# Patient Record
Sex: FEMALE | Race: WHITE | HISPANIC OR LATINO | Employment: OTHER | URBAN - METROPOLITAN AREA
[De-identification: names, ages, dates, MRNs, and addresses within clinical notes are randomized per-mention and may not be internally consistent; named-entity substitution may affect disease eponyms.]

---

## 2017-09-26 ENCOUNTER — ALLSCRIPTS OFFICE VISIT (OUTPATIENT)
Dept: OTHER | Facility: OTHER | Age: 62
End: 2017-09-26

## 2017-10-20 ENCOUNTER — GENERIC CONVERSION - ENCOUNTER (OUTPATIENT)
Dept: OTHER | Facility: OTHER | Age: 62
End: 2017-10-20

## 2018-01-02 ENCOUNTER — GENERIC CONVERSION - ENCOUNTER (OUTPATIENT)
Dept: OTHER | Facility: OTHER | Age: 63
End: 2018-01-02

## 2018-01-02 ENCOUNTER — GENERIC CONVERSION - ENCOUNTER (OUTPATIENT)
Dept: FAMILY MEDICINE CLINIC | Facility: CLINIC | Age: 63
End: 2018-01-02

## 2018-01-14 VITALS
WEIGHT: 122 LBS | SYSTOLIC BLOOD PRESSURE: 122 MMHG | BODY MASS INDEX: 20.33 KG/M2 | DIASTOLIC BLOOD PRESSURE: 68 MMHG | HEART RATE: 68 BPM | HEIGHT: 65 IN | TEMPERATURE: 98.7 F

## 2018-01-22 VITALS
SYSTOLIC BLOOD PRESSURE: 124 MMHG | WEIGHT: 121.5 LBS | HEART RATE: 64 BPM | BODY MASS INDEX: 20.24 KG/M2 | HEIGHT: 65 IN | DIASTOLIC BLOOD PRESSURE: 72 MMHG

## 2018-06-22 ENCOUNTER — OFFICE VISIT (OUTPATIENT)
Dept: FAMILY MEDICINE CLINIC | Facility: CLINIC | Age: 63
End: 2018-06-22
Payer: COMMERCIAL

## 2018-06-22 VITALS
HEART RATE: 68 BPM | WEIGHT: 121 LBS | SYSTOLIC BLOOD PRESSURE: 120 MMHG | RESPIRATION RATE: 16 BRPM | BODY MASS INDEX: 22.84 KG/M2 | DIASTOLIC BLOOD PRESSURE: 76 MMHG | HEIGHT: 61 IN

## 2018-06-22 DIAGNOSIS — R20.2 NUMBNESS AND TINGLING OF LEFT LEG: ICD-10-CM

## 2018-06-22 DIAGNOSIS — R20.0 NUMBNESS AND TINGLING OF LEFT LEG: ICD-10-CM

## 2018-06-22 DIAGNOSIS — L98.9 SKIN LESION OF FACE: Primary | ICD-10-CM

## 2018-06-22 PROCEDURE — 1036F TOBACCO NON-USER: CPT | Performed by: FAMILY MEDICINE

## 2018-06-22 PROCEDURE — 3008F BODY MASS INDEX DOCD: CPT | Performed by: FAMILY MEDICINE

## 2018-06-22 PROCEDURE — 99213 OFFICE O/P EST LOW 20 MIN: CPT | Performed by: FAMILY MEDICINE

## 2018-06-22 NOTE — PATIENT INSTRUCTIONS
Problem List Items Addressed This Visit        Musculoskeletal and Integument    Skin lesion of face - Primary     Skin lesion is present, as changed  Based on this, I am concerned enough that she should be seeing Dermatology or plastics  I will enter referral for both, and she will go to ever she can get in with 1st   It does not appear to be malignant, but again it has changed and has coloration difference is in the 2 different areas of it  Relevant Orders    Ambulatory referral to Dermatology    Ambulatory referral to Plastic Surgery       Other    Numbness and tingling of left leg     Patient has minimal findings on exam today  Will re-evaluate in the future  Consider further testing such as x-rays or EMG

## 2018-06-22 NOTE — PROGRESS NOTES
Assessment/Plan:    Skin lesion of face  Skin lesion is present, as changed  Based on this, I am concerned enough that she should be seeing Dermatology or plastics  I will enter referral for both, and she will go to ever she can get in with 1st   It does not appear to be malignant, but again it has changed and has coloration difference is in the 2 different areas of it  Numbness and tingling of left leg  Patient has minimal findings on exam today  Will re-evaluate in the future  Consider further testing such as x-rays or EMG  Diagnoses and all orders for this visit:    Skin lesion of face  -     Ambulatory referral to Dermatology; Future  -     Ambulatory referral to Plastic Surgery; Future    Numbness and tingling of left leg    Other orders  -     aspirin 81 MG tablet; Take by mouth          Subjective: pt here with complains of lump on right side of face x 1 month, also complains of numbness on left thigh 1 month  JAVAN Mooney     Patient ID: Lorene Flynn is a 58 y o  female  Patient is a small lesion on the right side of the face  It is been there for a while now, however recently it has raised  Coloration is slightly darker than before  She was not concerned about it having a difference in color, merely the fact that it has now been raised rather than flat  Patient also mention that she has some irritation on her lower extremities  There is some pins and needles at times  When she goes to stand up she has it more so than not  It is mostly on the left side  The following portions of the patient's history were reviewed and updated as appropriate: allergies, current medications and problem list     Review of Systems   Constitutional: Negative  HENT: Negative      Skin:        Per HPI         Objective:      Vitals:    06/22/18 1540   BP: 120/76   BP Location: Left arm   Patient Position: Sitting   Cuff Size: Standard   Pulse: 68   Resp: 16   Weight: 54 9 kg (121 lb)   Height: 5' 1" (1 549 m)          Physical Exam   Musculoskeletal: She exhibits no edema, tenderness or deformity  No findings on the sciatic notch, greater trochanteric bursae, or low back  Neurological: She displays normal reflexes  She exhibits normal muscle tone  Coordination normal    Skin:   Patient does have a lesion on the face  It is on the right side at the temple  There is a darker pigmented area towards the posterior aspect of it  There is a sharp demarcation between that and the lighter pigmentation anteriorly  The borders of the entire lesion are quite clear and sharp  The darker area is slightly raised verses the later anterior area  Nursing note and vitals reviewed

## 2018-06-22 NOTE — ASSESSMENT & PLAN NOTE
Skin lesion is present, as changed  Based on this, I am concerned enough that she should be seeing Dermatology or plastics  I will enter referral for both, and she will go to ever she can get in with 1st   It does not appear to be malignant, but again it has changed and has coloration difference is in the 2 different areas of it

## 2018-06-22 NOTE — ASSESSMENT & PLAN NOTE
Patient has minimal findings on exam today  Will re-evaluate in the future  Consider further testing such as x-rays or EMG

## 2018-07-13 PROCEDURE — 88341 IMHCHEM/IMCYTCHM EA ADD ANTB: CPT | Performed by: PATHOLOGY

## 2018-07-13 PROCEDURE — 88305 TISSUE EXAM BY PATHOLOGIST: CPT | Performed by: PATHOLOGY

## 2018-07-13 PROCEDURE — 88342 IMHCHEM/IMCYTCHM 1ST ANTB: CPT | Performed by: PATHOLOGY

## 2018-07-16 ENCOUNTER — LAB REQUISITION (OUTPATIENT)
Dept: LAB | Facility: HOSPITAL | Age: 63
End: 2018-07-16
Payer: COMMERCIAL

## 2018-07-16 DIAGNOSIS — D49.2 NEOPLASM OF UNSPECIFIED BEHAVIOR OF BONE, SOFT TISSUE, AND SKIN: ICD-10-CM

## 2019-04-30 ENCOUNTER — OFFICE VISIT (OUTPATIENT)
Dept: FAMILY MEDICINE CLINIC | Facility: CLINIC | Age: 64
End: 2019-04-30
Payer: COMMERCIAL

## 2019-04-30 VITALS
TEMPERATURE: 99 F | SYSTOLIC BLOOD PRESSURE: 106 MMHG | DIASTOLIC BLOOD PRESSURE: 70 MMHG | WEIGHT: 118.4 LBS | HEIGHT: 61 IN | BODY MASS INDEX: 22.36 KG/M2

## 2019-04-30 DIAGNOSIS — Z12.31 SCREENING MAMMOGRAM, ENCOUNTER FOR: ICD-10-CM

## 2019-04-30 DIAGNOSIS — Z23 ENCOUNTER FOR IMMUNIZATION: Primary | ICD-10-CM

## 2019-04-30 DIAGNOSIS — Z12.11 ENCOUNTER FOR SCREENING COLONOSCOPY: ICD-10-CM

## 2019-04-30 DIAGNOSIS — R35.0 URINARY FREQUENCY: ICD-10-CM

## 2019-04-30 DIAGNOSIS — N30.01 ACUTE CYSTITIS WITH HEMATURIA: ICD-10-CM

## 2019-04-30 PROBLEM — D17.24 LIPOMA OF LEFT LOWER EXTREMITY: Status: ACTIVE | Noted: 2017-10-20

## 2019-04-30 LAB
SL AMB  POCT GLUCOSE, UA: NORMAL
SL AMB LEUKOCYTE ESTERASE,UA: NORMAL
SL AMB POCT BILIRUBIN,UA: NORMAL
SL AMB POCT BLOOD,UA: NORMAL
SL AMB POCT CLARITY,UA: CLEAR
SL AMB POCT COLOR,UA: YELLOW
SL AMB POCT KETONES,UA: NORMAL
SL AMB POCT NITRITE,UA: NORMAL
SL AMB POCT PH,UA: 6
SL AMB POCT SPECIFIC GRAVITY,UA: 1.02
SL AMB POCT URINE PROTEIN: NORMAL
SL AMB POCT UROBILINOGEN: 0.2

## 2019-04-30 PROCEDURE — 1101F PT FALLS ASSESS-DOCD LE1/YR: CPT | Performed by: PHYSICIAN ASSISTANT

## 2019-04-30 PROCEDURE — 3008F BODY MASS INDEX DOCD: CPT | Performed by: PHYSICIAN ASSISTANT

## 2019-04-30 PROCEDURE — 99214 OFFICE O/P EST MOD 30 MIN: CPT | Performed by: PHYSICIAN ASSISTANT

## 2019-04-30 PROCEDURE — 87086 URINE CULTURE/COLONY COUNT: CPT | Performed by: PHYSICIAN ASSISTANT

## 2019-04-30 PROCEDURE — 1036F TOBACCO NON-USER: CPT | Performed by: PHYSICIAN ASSISTANT

## 2019-04-30 PROCEDURE — 87077 CULTURE AEROBIC IDENTIFY: CPT | Performed by: PHYSICIAN ASSISTANT

## 2019-04-30 PROCEDURE — 81003 URINALYSIS AUTO W/O SCOPE: CPT | Performed by: PHYSICIAN ASSISTANT

## 2019-04-30 PROCEDURE — 87186 SC STD MICRODIL/AGAR DIL: CPT | Performed by: PHYSICIAN ASSISTANT

## 2019-04-30 RX ORDER — NITROFURANTOIN 25; 75 MG/1; MG/1
100 CAPSULE ORAL 2 TIMES DAILY
Qty: 14 CAPSULE | Refills: 0 | Status: SHIPPED | OUTPATIENT
Start: 2019-04-30 | End: 2019-05-07

## 2019-05-03 LAB — BACTERIA UR CULT: ABNORMAL

## 2019-08-29 ENCOUNTER — OFFICE VISIT (OUTPATIENT)
Dept: FAMILY MEDICINE CLINIC | Facility: CLINIC | Age: 64
End: 2019-08-29
Payer: COMMERCIAL

## 2019-08-29 ENCOUNTER — APPOINTMENT (OUTPATIENT)
Dept: RADIOLOGY | Facility: MEDICAL CENTER | Age: 64
End: 2019-08-29
Payer: COMMERCIAL

## 2019-08-29 VITALS
BODY MASS INDEX: 22.2 KG/M2 | SYSTOLIC BLOOD PRESSURE: 126 MMHG | HEIGHT: 61 IN | HEART RATE: 72 BPM | RESPIRATION RATE: 18 BRPM | WEIGHT: 117.6 LBS | DIASTOLIC BLOOD PRESSURE: 74 MMHG | TEMPERATURE: 98.1 F | OXYGEN SATURATION: 98 %

## 2019-08-29 DIAGNOSIS — Z02.1 PHYSICAL EXAM, PRE-EMPLOYMENT: Primary | ICD-10-CM

## 2019-08-29 DIAGNOSIS — Z02.1 PHYSICAL EXAM, PRE-EMPLOYMENT: ICD-10-CM

## 2019-08-29 PROCEDURE — 71046 X-RAY EXAM CHEST 2 VIEWS: CPT

## 2019-08-29 PROCEDURE — 99397 PER PM REEVAL EST PAT 65+ YR: CPT | Performed by: FAMILY MEDICINE

## 2019-08-29 NOTE — PATIENT INSTRUCTIONS
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

## 2019-11-12 ENCOUNTER — OFFICE VISIT (OUTPATIENT)
Dept: FAMILY MEDICINE CLINIC | Facility: CLINIC | Age: 64
End: 2019-11-12
Payer: COMMERCIAL

## 2019-11-12 VITALS
DIASTOLIC BLOOD PRESSURE: 70 MMHG | SYSTOLIC BLOOD PRESSURE: 130 MMHG | TEMPERATURE: 98 F | WEIGHT: 119 LBS | BODY MASS INDEX: 22.47 KG/M2 | HEIGHT: 61 IN

## 2019-11-12 DIAGNOSIS — N30.01 ACUTE CYSTITIS WITH HEMATURIA: Primary | ICD-10-CM

## 2019-11-12 LAB
SL AMB  POCT GLUCOSE, UA: ABNORMAL
SL AMB LEUKOCYTE ESTERASE,UA: ABNORMAL
SL AMB POCT BILIRUBIN,UA: ABNORMAL
SL AMB POCT BLOOD,UA: ABNORMAL
SL AMB POCT CLARITY,UA: ABNORMAL
SL AMB POCT COLOR,UA: YELLOW
SL AMB POCT KETONES,UA: ABNORMAL
SL AMB POCT NITRITE,UA: ABNORMAL
SL AMB POCT PH,UA: 7.5
SL AMB POCT SPECIFIC GRAVITY,UA: 1.01
SL AMB POCT URINE PROTEIN: ABNORMAL
SL AMB POCT UROBILINOGEN: 1

## 2019-11-12 PROCEDURE — 87077 CULTURE AEROBIC IDENTIFY: CPT | Performed by: FAMILY MEDICINE

## 2019-11-12 PROCEDURE — 87086 URINE CULTURE/COLONY COUNT: CPT | Performed by: FAMILY MEDICINE

## 2019-11-12 PROCEDURE — 99213 OFFICE O/P EST LOW 20 MIN: CPT | Performed by: FAMILY MEDICINE

## 2019-11-12 PROCEDURE — 1036F TOBACCO NON-USER: CPT | Performed by: FAMILY MEDICINE

## 2019-11-12 PROCEDURE — 81002 URINALYSIS NONAUTO W/O SCOPE: CPT | Performed by: FAMILY MEDICINE

## 2019-11-12 PROCEDURE — 87186 SC STD MICRODIL/AGAR DIL: CPT | Performed by: FAMILY MEDICINE

## 2019-11-12 RX ORDER — NITROFURANTOIN 25; 75 MG/1; MG/1
100 CAPSULE ORAL 2 TIMES DAILY
Qty: 14 CAPSULE | Refills: 0 | Status: SHIPPED | OUTPATIENT
Start: 2019-11-12 | End: 2019-11-19

## 2019-11-12 NOTE — PROGRESS NOTES
Assessment and Plan:    Problem List Items Addressed This Visit     Acute cystitis with hematuria - Primary    Relevant Medications    nitrofurantoin (MACROBID) 100 mg capsule    Other Relevant Orders    POCT urine dip (Completed)    Urine culture                 Diagnoses and all orders for this visit:    Acute cystitis with hematuria  Comments:  Recommend Macrobid  Follow in 2 weeks if needed  Reviewed hygiene  Orders:  -     POCT urine dip  -     Cancel: Urine culture; Future  -     Urine culture; Future  -     nitrofurantoin (MACROBID) 100 mg capsule; Take 1 capsule (100 mg total) by mouth 2 (two) times a day for 7 days              Subjective:      Patient ID: Denver Ponder is a 71 y o  female  CC:    Chief Complaint   Patient presents with    Possible UTI     pt is c/o of foul smell to urine and frequency     Urinary Frequency       HPI:    Patient feels she may have a UTI  No burning  Some urgency, some frequency  Smell is quite bad  Has been 2-3 weeks  Staying the same  No meds so far  Previously she did have infections, and had fever with that  Reviewed hygene  The following portions of the patient's history were reviewed and updated as appropriate: allergies, current medications and problem list       Review of Systems   Constitutional: Positive for fatigue  Negative for chills and fever  HENT: Negative  Respiratory: Negative  Genitourinary: Positive for frequency and urgency  Negative for difficulty urinating and dysuria  Musculoskeletal: Negative  All other systems reviewed and are negative  Data to review:   UA did show some significant abnormalities today  Please see report in the chart      Objective:    Vitals:    11/12/19 1514   BP: 130/70   BP Location: Left arm   Patient Position: Sitting   Cuff Size: Adult   Temp: 98 °F (36 7 °C)   TempSrc: Oral   Weight: 54 kg (119 lb)   Height: 5' 1" (1 549 m)        Physical Exam   Constitutional: She appears well-developed and well-nourished  HENT:   Head: Normocephalic and atraumatic  Cardiovascular: Normal rate, regular rhythm and normal heart sounds  Pulses:       Carotid pulses are 2+ on the right side, and 2+ on the left side  Pulmonary/Chest: Effort normal and breath sounds normal  She has no wheezes  She has no rales  She exhibits no tenderness  Nursing note and vitals reviewed

## 2019-11-12 NOTE — PATIENT INSTRUCTIONS
Problem List Items Addressed This Visit     Acute cystitis with hematuria - Primary    Relevant Medications    nitrofurantoin (MACROBID) 100 mg capsule    Other Relevant Orders    POCT urine dip (Completed)    Urine culture        Urinary Tract Infection in Women   AMBULATORY CARE:   A urinary tract infection (UTI)  is caused by bacteria that get inside your urinary tract  Most bacteria that enter your urinary tract come out when you urinate  If the bacteria stay in your urinary tract, you may get an infection  Your urinary tract includes your kidneys, ureters, bladder, and urethra  Urine is made in your kidneys, and it flows from the ureters to the bladder  Urine leaves the bladder through the urethra  A UTI is more common in your lower urinary tract, which includes your bladder and urethra  Common symptoms include the following:   · Urinating more often or waking from sleep to urinate    · Pain or burning when you urinate    · Pain or pressure in your lower abdomen     · Urine that smells bad    · Blood in your urine    · Leaking urine  Seek care immediately if:   · You are urinating very little or not at all  · You have a high fever with shaking chills  · You have side or back pain that gets worse  Contact your healthcare provider if:   · You have a fever  · You do not feel better after 2 days of taking antibiotics  · You are vomiting  · You have questions or concerns about your condition or care  Treatment for a UTI  may include medicines to treat a bacterial infection  You may also need medicines to decrease pain and burning, or decrease the urge to urinate often  Prevent a UTI:   · Empty your bladder often  Urinate and empty your bladder as soon as you feel the need  Do not hold your urine for long periods of time  · Wipe from front to back after you urinate or have a bowel movement    This will help prevent germs from getting into your urinary tract through your urethra  · Drink liquids as directed  Ask how much liquid to drink each day and which liquids are best for you  You may need to drink more liquids than usual to help flush out the bacteria  Do not drink alcohol, caffeine, or citrus juices  These can irritate your bladder and increase your symptoms  Your healthcare provider may recommend cranberry juice to help prevent a UTI  · Urinate after you have sex  This can help flush out bacteria passed during sex  · Do not douche or use feminine deodorants  These can change the chemical balance in your vagina  · Change sanitary pads or tampons often  This will help prevent germs from getting into your urinary tract  · Do pelvic muscle exercises often  Pelvic muscle exercises may help you start and stop urinating  Strong pelvic muscles may help you empty your bladder easier  Squeeze these muscles tightly for 5 seconds like you are trying to hold back urine  Then relax for 5 seconds  Gradually work up to squeezing for 10 seconds  Do 3 sets of 15 repetitions a day, or as directed  Follow up with your healthcare provider as directed:  Write down your questions so you remember to ask them during your visits  © 2017 [facility] Information is for End User's use only and may not be sold, redistributed or otherwise used for commercial purposes  All illustrations and images included in CareNotes® are the copyrighted property of A D A M , Inc  or Austin Sanchez  The above information is an  only  It is not intended as medical advice for individual conditions or treatments  Talk to your doctor, nurse or pharmacist before following any medical regimen to see if it is safe and effective for you  Infección del tracto urinario en mujeres, cuidados ambulatorios   INFORMACIÓN GENERAL:   Eryn infección en el tracto urinario (ITU)  ocurre cuando entran bacterias en da silva tracto urinario   Da Silva tracto urinario incluye wendy riñones, uréteres, vejiga y uretra  La orina es producida en los riñones y fluye del uréter a la vejiga  La orina sale de la vejiga a través de la uretra  Carla infección en el tracto urinario es más común en la parte inferior de villareal tracto urinario, la cual incluye villareal vejiga y uretra  Síntomas comunes incluyen los siguientes:   · Orinar con más frecuencia o despertarse en la noche para orinar    · Dolor o ardor al orinar    · Dolor o presión en la parte inferior de villareal abdomen     · Orina que tiene carla aroma desagradable    · Tomas en villareal orina    · Goteo de orina  Busque cuidados inmediatos para los siguientes síntomas:   · Robledo Castro o no orinar del todo    · Vómito    · Escalofríos sara y fiebre    · Dolor al lado o de espalda que empeora  El tratamiento para la ITU  puede llegar a incluir medicamentos para tratar carla infección bacterial  Es probable también que usted necesite medicamentos para disminuir el dolor y la quemazón o disminuir la urgencia de orinar frecuentemente  Prevenga carla ITU:   · Orine cuando sienta la urgencia  No aguante la orina  Orine lay pronto shelby sienta la necesidad de [place]  · [place] líquidos según indicaciones  Pregunte cuánto líquido usted necesita beber a diario y cuáles líquidos son mejores para usted  Es probable que usted necesite ud más líquidos de lo normal para ayudar a deshacerse de la bacteria  No tome bebidas alcohólicas, cafeína y jugos cítricos  Estos pueden irritar villareal vejiga y aumentar wendy síntomas  · Aplique calor  sobre villareal abdomen por 20 a 30 minutos cada 2 horas por tantos días shelby le indiquen  El calor ayuda a disminuir las molestias y la presión en villareal vejiga  Programe carla keri con villareal proveedor de [employer] se le haya indicado: Anote wendy preguntas para que se acuerde de hacerlas yamile wendy visitas  ACUERDOS SOBRE VILLAREAL CUIDADO:   Usted tiene el derecho de participar en la planificación de villareal cuidado   Aprenda todo lo que pueda [facility] y shelby darle tratamiento  Discuta con wendy médicos wendy opciones de tratamiento para juntos decidir el cuidado que usted quiere recibir  Usted siempre tiene el derecho a rechazar da silva tratamiento  Esta información es sólo para uso en educación  Da Silva intención no es darle un consejo médico sobre enfermedades o tratamientos  Colsulte con da silva Mabeline Loser farmacéutico antes de seguir cualquier régimen médico para saber si es seguro y efectivo para usted  © 2014 [address] [employer] is for End User's use only and may not be sold, redistributed or otherwise used for commercial purposes  All illustrations and images included in CareNotes® are the copyrighted property of A D A M , Inc  or [facility]

## 2019-11-15 LAB — BACTERIA UR CULT: ABNORMAL

## 2019-12-04 ENCOUNTER — OFFICE VISIT (OUTPATIENT)
Dept: FAMILY MEDICINE CLINIC | Facility: CLINIC | Age: 64
End: 2019-12-04
Payer: COMMERCIAL

## 2019-12-04 VITALS
TEMPERATURE: 98 F | BODY MASS INDEX: 22.47 KG/M2 | HEART RATE: 68 BPM | DIASTOLIC BLOOD PRESSURE: 72 MMHG | HEIGHT: 61 IN | WEIGHT: 119 LBS | SYSTOLIC BLOOD PRESSURE: 118 MMHG

## 2019-12-04 DIAGNOSIS — R20.9 SENSATION OF COLD IN LEG: ICD-10-CM

## 2019-12-04 DIAGNOSIS — N30.01 ACUTE CYSTITIS WITH HEMATURIA: Primary | ICD-10-CM

## 2019-12-04 LAB
SL AMB  POCT GLUCOSE, UA: ABNORMAL
SL AMB LEUKOCYTE ESTERASE,UA: ABNORMAL
SL AMB POCT BILIRUBIN,UA: ABNORMAL
SL AMB POCT BLOOD,UA: ABNORMAL
SL AMB POCT CLARITY,UA: CLEAR
SL AMB POCT COLOR,UA: YELLOW
SL AMB POCT KETONES,UA: ABNORMAL
SL AMB POCT NITRITE,UA: POSITIVE
SL AMB POCT PH,UA: 7.5
SL AMB POCT SPECIFIC GRAVITY,UA: 1.02
SL AMB POCT URINE PROTEIN: ABNORMAL
SL AMB POCT UROBILINOGEN: 1

## 2019-12-04 PROCEDURE — 99213 OFFICE O/P EST LOW 20 MIN: CPT | Performed by: FAMILY MEDICINE

## 2019-12-04 PROCEDURE — 87186 SC STD MICRODIL/AGAR DIL: CPT | Performed by: FAMILY MEDICINE

## 2019-12-04 PROCEDURE — 81002 URINALYSIS NONAUTO W/O SCOPE: CPT | Performed by: FAMILY MEDICINE

## 2019-12-04 PROCEDURE — 1036F TOBACCO NON-USER: CPT | Performed by: FAMILY MEDICINE

## 2019-12-04 PROCEDURE — 87086 URINE CULTURE/COLONY COUNT: CPT | Performed by: FAMILY MEDICINE

## 2019-12-04 PROCEDURE — 87077 CULTURE AEROBIC IDENTIFY: CPT | Performed by: FAMILY MEDICINE

## 2019-12-04 PROCEDURE — 3008F BODY MASS INDEX DOCD: CPT | Performed by: FAMILY MEDICINE

## 2019-12-04 RX ORDER — ESTRADIOL 0.04 MG/D
FILM, EXTENDED RELEASE TRANSDERMAL
Refills: 3 | COMMUNITY
Start: 2019-10-07 | End: 2021-07-12 | Stop reason: SDUPTHER

## 2019-12-04 RX ORDER — AMOXICILLIN 875 MG/1
875 TABLET, COATED ORAL 2 TIMES DAILY
Qty: 20 TABLET | Refills: 0 | Status: SHIPPED | OUTPATIENT
Start: 2019-12-04 | End: 2019-12-14

## 2019-12-04 RX ORDER — CONJUGATED ESTROGENS 0.62 MG/G
CREAM VAGINAL
Refills: 0 | COMMUNITY
Start: 2019-10-01 | End: 2022-01-19 | Stop reason: ALTCHOICE

## 2019-12-04 NOTE — PROGRESS NOTES
Assessment and Plan:    Problem List Items Addressed This Visit     Acute cystitis with hematuria - Primary    Relevant Medications    PREMARIN vaginal cream    amoxicillin (AMOXIL) 875 mg tablet    Other Relevant Orders    POCT urine dip (Completed)    Urine culture    Sensation of cold in leg     Patient does have the sensation of coldness in her left leg  Question if this is related to P AD verses Raynaud's phenomenon  At this point, would recommend MARY, and will also obtain venous ultrasound of the left  This would rule out blood clot as well as arterial circulation issues  If these are negative, then my assumption would be Raynaud's  Relevant Orders    VAS MARY & waveform analysis, multiple levels    VAS lower limb venous duplex study, unilateral/limited                 Diagnoses and all orders for this visit:    Acute cystitis with hematuria  Comments:  Patient is having a recurrent UTI   amox 875 for 10 days  Consider suppression therapy, follow with Urology if recurs  Orders:  -     POCT urine dip  -     Urine culture  -     amoxicillin (AMOXIL) 875 mg tablet; Take 1 tablet (875 mg total) by mouth 2 (two) times a day for 10 days    Sensation of cold in leg  -     VAS MARY & waveform analysis, multiple levels; Future  -     VAS lower limb venous duplex study, unilateral/limited; Future    Other orders  -     PREMARIN vaginal cream; INSERT 1 GM TWICE WEEKLY FOR 4 WEEKS EVERY 3 MONTHS  -     MINIVELLE 0 0375 MG/24HR; APPLY 1 PATCH TWICE A WEEK              Subjective:      Patient ID: Venkat Snyder is a 71 y o  female  CC:    Chief Complaint   Patient presents with    Follow-up    Urinary Tract Infection     Continues with strong urine odor and urgency  mjs       HPI:    Patient was recently treated by this office for UTI  She was found to have E coli, greater than 100,000  It was sensitive to Macrobid, which was the antibiotic that she was on    Since then, unfortunately, she has had a recurrence in the symptoms  When she finished the antibiotics, 1 or 2 days later in the symptoms returned  Today's urinalysis does show some abnormalities, consistent with UTI  We did review the same things as before, hygiene, drinking water, other options to try and decrease the risk of infection  Patient's noted that she has some cooling temperatures in her legs  She has noted specially that her left leg feels like an ice cube at times  She has noted this previously, but it was while she was in college  The following portions of the patient's history were reviewed and updated as appropriate: allergies, current medications and problem list       Review of Systems   Constitutional: Positive for fatigue  HENT: Negative  Genitourinary:        Per HPI  Significant change in urine smell  Data to review:       Objective:    Vitals:    12/04/19 1559   BP: 118/72   Pulse: 68   Temp: 98 °F (36 7 °C)   Weight: 54 kg (119 lb)   Height: 5' 1" (1 549 m)        Physical Exam   Constitutional: She appears well-developed and well-nourished  HENT:   Head: Normocephalic and atraumatic  Cardiovascular: Normal rate, regular rhythm and normal heart sounds  Pulses:       Carotid pulses are 2+ on the right side, and 2+ on the left side  Normal pulses on left foot  Normal temperature as well  Pulmonary/Chest: Effort normal and breath sounds normal  She has no wheezes  She has no rales  She exhibits no tenderness  Nursing note and vitals reviewed

## 2019-12-04 NOTE — ASSESSMENT & PLAN NOTE
Patient does have the sensation of coldness in her left leg  Question if this is related to P AD verses Raynaud's phenomenon  At this point, would recommend MARY, and will also obtain venous ultrasound of the left  This would rule out blood clot as well as arterial circulation issues  If these are negative, then my assumption would be Raynaud's

## 2019-12-04 NOTE — PATIENT INSTRUCTIONS
Problem List Items Addressed This Visit     Acute cystitis with hematuria - Primary    Relevant Medications    PREMARIN vaginal cream    amoxicillin (AMOXIL) 875 mg tablet    Other Relevant Orders    POCT urine dip (Completed)    Urine culture    Sensation of cold in leg     Patient does have the sensation of coldness in her left leg  Question if this is related to P AD verses Raynaud's phenomenon  At this point, would recommend MARY, and will also obtain venous ultrasound of the left  This would rule out blood clot as well as arterial circulation issues  If these are negative, then my assumption would be Raynaud's           Relevant Orders    VAS MARY & waveform analysis, multiple levels    VAS lower limb venous duplex study, unilateral/limited

## 2019-12-06 LAB — BACTERIA UR CULT: ABNORMAL

## 2019-12-11 ENCOUNTER — HOSPITAL ENCOUNTER (OUTPATIENT)
Dept: NON INVASIVE DIAGNOSTICS | Facility: HOSPITAL | Age: 64
Discharge: HOME | End: 2019-12-11
Payer: COMMERCIAL

## 2019-12-11 DIAGNOSIS — R20.9 SENSATION OF COLD IN LEG: ICD-10-CM

## 2019-12-11 PROCEDURE — 93923 UPR/LXTR ART STDY 3+ LVLS: CPT | Performed by: SURGERY

## 2019-12-11 PROCEDURE — 93971 EXTREMITY STUDY: CPT

## 2019-12-11 PROCEDURE — 93923 UPR/LXTR ART STDY 3+ LVLS: CPT

## 2019-12-11 PROCEDURE — 93971 EXTREMITY STUDY: CPT | Performed by: SURGERY

## 2020-02-05 ENCOUNTER — TELEPHONE (OUTPATIENT)
Dept: OTHER | Facility: OTHER | Age: 65
End: 2020-02-05

## 2020-02-05 ENCOUNTER — OFFICE VISIT (OUTPATIENT)
Dept: FAMILY MEDICINE CLINIC | Facility: CLINIC | Age: 65
End: 2020-02-05
Payer: COMMERCIAL

## 2020-02-05 ENCOUNTER — HOSPITAL ENCOUNTER (OUTPATIENT)
Dept: CT IMAGING | Facility: HOSPITAL | Age: 65
Discharge: HOME | End: 2020-02-05
Payer: COMMERCIAL

## 2020-02-05 VITALS
SYSTOLIC BLOOD PRESSURE: 122 MMHG | TEMPERATURE: 99.6 F | HEART RATE: 76 BPM | HEIGHT: 61 IN | BODY MASS INDEX: 22.28 KG/M2 | DIASTOLIC BLOOD PRESSURE: 64 MMHG | WEIGHT: 118 LBS

## 2020-02-05 DIAGNOSIS — Z12.11 SCREEN FOR COLON CANCER: ICD-10-CM

## 2020-02-05 DIAGNOSIS — R31.9 HEMATURIA, UNSPECIFIED TYPE: ICD-10-CM

## 2020-02-05 DIAGNOSIS — R10.30 LOWER ABDOMINAL PAIN: ICD-10-CM

## 2020-02-05 DIAGNOSIS — R10.30 LOWER ABDOMINAL PAIN: Primary | ICD-10-CM

## 2020-02-05 PROBLEM — N30.01 ACUTE CYSTITIS WITH HEMATURIA: Status: RESOLVED | Noted: 2019-04-30 | Resolved: 2020-02-05

## 2020-02-05 LAB
SL AMB  POCT GLUCOSE, UA: NORMAL
SL AMB LEUKOCYTE ESTERASE,UA: NORMAL
SL AMB POCT BILIRUBIN,UA: NORMAL
SL AMB POCT BLOOD,UA: NORMAL
SL AMB POCT CLARITY,UA: CLEAR
SL AMB POCT COLOR,UA: YELLOW
SL AMB POCT KETONES,UA: NORMAL
SL AMB POCT NITRITE,UA: NORMAL
SL AMB POCT PH,UA: 6.5
SL AMB POCT SPECIFIC GRAVITY,UA: 1.02
SL AMB POCT URINE PROTEIN: NORMAL
SL AMB POCT UROBILINOGEN: 0.2

## 2020-02-05 PROCEDURE — 74176 CT ABD & PELVIS W/O CONTRAST: CPT

## 2020-02-05 PROCEDURE — 3008F BODY MASS INDEX DOCD: CPT | Performed by: FAMILY MEDICINE

## 2020-02-05 PROCEDURE — 1036F TOBACCO NON-USER: CPT | Performed by: FAMILY MEDICINE

## 2020-02-05 PROCEDURE — 1160F RVW MEDS BY RX/DR IN RCRD: CPT | Performed by: FAMILY MEDICINE

## 2020-02-05 PROCEDURE — 99213 OFFICE O/P EST LOW 20 MIN: CPT | Performed by: FAMILY MEDICINE

## 2020-02-05 PROCEDURE — 81002 URINALYSIS NONAUTO W/O SCOPE: CPT | Performed by: FAMILY MEDICINE

## 2020-02-05 NOTE — PATIENT INSTRUCTIONS
Problem List Items Addressed This Visit     None      Visit Diagnoses     Lower abdominal pain    -  Primary    Recommend CT stone study  Not likely appendix  Check labs as well  Relevant Orders    POCT urine dip (Completed)    CT renal stone study abdomen pelvis wo contrast    Hematuria, unspecified type        Has blood in urine with abdominal pain  Likely stone till proven otherwise  Check CT stone study  Relevant Orders    CT renal stone study abdomen pelvis wo contrast    Screen for colon cancer        Relevant Orders    Occult Blood, Fecal Immunochemical        Continue Advil for your discomfort

## 2020-02-05 NOTE — PROGRESS NOTES
Assessment and Plan:    Problem List Items Addressed This Visit     None      Visit Diagnoses     Lower abdominal pain    -  Primary    Recommend CT stone study  Not likely appendix  Check labs as well  Relevant Orders    POCT urine dip (Completed)    CT renal stone study abdomen pelvis wo contrast    Hematuria, unspecified type        Has blood in urine with abdominal pain  Likely stone till proven otherwise  Check CT stone study  Relevant Orders    CT renal stone study abdomen pelvis wo contrast    Screen for colon cancer        Relevant Orders    Occult Blood, Fecal Immunochemical                 Diagnoses and all orders for this visit:    Lower abdominal pain  Comments:  Recommend CT stone study  Not likely appendix  Check labs as well  Orders:  -     POCT urine dip  -     CT renal stone study abdomen pelvis wo contrast; Future    Hematuria, unspecified type  Comments:  Has blood in urine with abdominal pain  Likely stone till proven otherwise  Check CT stone study  Orders:  -     CT renal stone study abdomen pelvis wo contrast; Future    Screen for colon cancer  -     Occult Blood, Fecal Immunochemical; Future              Subjective:      Patient ID: Silvio Bojorquez is a 76 y o  female  CC:    Chief Complaint   Patient presents with    Abdominal Pain     Onset 3 days ago  mjs    Back Pain    Hip Pain       HPI:    Patient is here to follow-up on a new problem of abdominal discomfort  Is on the right side  Start approximately 3 days ago  She use some Motrin today, that seemed to dull the pain a bit  It does not ever seem to come and go, it is just present  No nausea or vomiting  No other concerns  The following portions of the patient's history were reviewed and updated as appropriate: allergies, current medications and problem list       Review of Systems   Constitutional: Positive for chills, fatigue and fever  HENT: Negative  Eyes: Negative      Respiratory: Negative  Cardiovascular: Negative  Endocrine: Negative  Genitourinary: Negative  Musculoskeletal: Positive for back pain  Data to review:       Objective:    Vitals:    02/05/20 1525   BP: 122/64   Pulse: 76   Temp: 99 6 °F (37 6 °C)   Weight: 53 5 kg (118 lb)   Height: 5' 1" (1 549 m)        Physical Exam   Constitutional: She appears well-developed and well-nourished  HENT:   Head: Normocephalic and atraumatic  Cardiovascular: Normal rate, regular rhythm and normal heart sounds  Pulses:       Carotid pulses are 2+ on the right side, and 2+ on the left side  Pulmonary/Chest: Effort normal and breath sounds normal  She has no wheezes  She has no rales  She exhibits no tenderness  Abdominal: Soft  Normal appearance, normal aorta and bowel sounds are normal  She exhibits no shifting dullness, no distension, no pulsatile liver, no fluid wave, no abdominal bruit, no ascites, no pulsatile midline mass and no mass  There is no hepatosplenomegaly  There is tenderness in the right lower quadrant  There is no rigidity, no rebound, no guarding, no CVA tenderness, no tenderness at McBurney's point and negative Pack's sign  Hernia confirmed negative in the ventral area  Nursing note and vitals reviewed

## 2020-02-06 ENCOUNTER — TELEPHONE (OUTPATIENT)
Dept: FAMILY MEDICINE CLINIC | Facility: CLINIC | Age: 65
End: 2020-02-06

## 2020-02-06 DIAGNOSIS — N94.9 ADNEXAL CYST: Primary | ICD-10-CM

## 2020-02-06 NOTE — ASSESSMENT & PLAN NOTE
CT scan of the abdomen found pelvic cyst   With that, they recommended ultrasound and I would also recommend follow-up with gyn  Ordered both

## 2020-02-06 NOTE — TELEPHONE ENCOUNTER
DANISH from [facility] radiology calling in regards to CT renal stone and states that there were immediate findings

## 2020-02-06 NOTE — PROGRESS NOTES
Problem List Items Addressed This Visit     Adnexal cyst - Primary     CT scan of the abdomen found pelvic cyst   With that, they recommended ultrasound and I would also recommend follow-up with gyn  Ordered both           Relevant Orders    US pelvis complete non OB    Ambulatory referral to Obstetrics / Gynecology

## 2020-02-09 ENCOUNTER — HOSPITAL ENCOUNTER (OUTPATIENT)
Dept: ULTRASOUND IMAGING | Facility: HOSPITAL | Age: 65
Discharge: HOME | End: 2020-02-09
Payer: COMMERCIAL

## 2020-02-09 DIAGNOSIS — N94.9 ADNEXAL CYST: ICD-10-CM

## 2020-02-09 PROCEDURE — 76856 US EXAM PELVIC COMPLETE: CPT

## 2020-02-09 PROCEDURE — 76830 TRANSVAGINAL US NON-OB: CPT

## 2020-02-17 ENCOUNTER — TRANSCRIBE ORDERS (OUTPATIENT)
Dept: ADMINISTRATIVE | Facility: HOSPITAL | Age: 65
End: 2020-02-17

## 2020-02-17 ENCOUNTER — APPOINTMENT (OUTPATIENT)
Dept: LAB | Facility: MEDICAL CENTER | Age: 65
End: 2020-02-17
Payer: COMMERCIAL

## 2020-02-17 DIAGNOSIS — N83.202 CYSTS OF BOTH OVARIES: ICD-10-CM

## 2020-02-17 DIAGNOSIS — N83.202 CYSTS OF BOTH OVARIES: Primary | ICD-10-CM

## 2020-02-17 DIAGNOSIS — N83.201 CYSTS OF BOTH OVARIES: Primary | ICD-10-CM

## 2020-02-17 DIAGNOSIS — N83.201 CYSTS OF BOTH OVARIES: ICD-10-CM

## 2020-02-17 LAB
CANCER AG125 SERPL-ACNC: 9.5 U/ML (ref 0–30)
ERYTHROCYTE [DISTWIDTH] IN BLOOD BY AUTOMATED COUNT: 13.4 % (ref 11.6–15.1)
HCT VFR BLD AUTO: 47.9 % (ref 34.8–46.1)
HGB BLD-MCNC: 15 G/DL (ref 11.5–15.4)
MCH RBC QN AUTO: 27.6 PG (ref 26.8–34.3)
MCHC RBC AUTO-ENTMCNC: 31.3 G/DL (ref 31.4–37.4)
MCV RBC AUTO: 88 FL (ref 82–98)
PLATELET # BLD AUTO: 249 THOUSANDS/UL (ref 149–390)
PMV BLD AUTO: 9.2 FL (ref 8.9–12.7)
RBC # BLD AUTO: 5.43 MILLION/UL (ref 3.81–5.12)
WBC # BLD AUTO: 5.18 THOUSAND/UL (ref 4.31–10.16)

## 2020-02-17 PROCEDURE — 36415 COLL VENOUS BLD VENIPUNCTURE: CPT

## 2020-02-17 PROCEDURE — 86304 IMMUNOASSAY TUMOR CA 125: CPT

## 2020-02-17 PROCEDURE — 85027 COMPLETE CBC AUTOMATED: CPT

## 2020-03-02 ENCOUNTER — LAB (OUTPATIENT)
Dept: LAB | Facility: MEDICAL CENTER | Age: 65
End: 2020-03-02
Payer: COMMERCIAL

## 2020-03-02 DIAGNOSIS — Z12.11 SCREEN FOR COLON CANCER: ICD-10-CM

## 2020-03-02 LAB — HEMOCCULT STL QL IA: NEGATIVE

## 2020-03-02 PROCEDURE — G0328 FECAL BLOOD SCRN IMMUNOASSAY: HCPCS

## 2020-03-16 ENCOUNTER — OFFICE VISIT (OUTPATIENT)
Dept: FAMILY MEDICINE CLINIC | Facility: CLINIC | Age: 65
End: 2020-03-16
Payer: COMMERCIAL

## 2020-03-16 VITALS
SYSTOLIC BLOOD PRESSURE: 128 MMHG | BODY MASS INDEX: 21.9 KG/M2 | TEMPERATURE: 98.8 F | HEIGHT: 61 IN | DIASTOLIC BLOOD PRESSURE: 62 MMHG | WEIGHT: 116 LBS | HEART RATE: 80 BPM

## 2020-03-16 DIAGNOSIS — L30.9 DERMATITIS: Primary | ICD-10-CM

## 2020-03-16 PROCEDURE — 1160F RVW MEDS BY RX/DR IN RCRD: CPT | Performed by: FAMILY MEDICINE

## 2020-03-16 PROCEDURE — 1036F TOBACCO NON-USER: CPT | Performed by: FAMILY MEDICINE

## 2020-03-16 PROCEDURE — 3008F BODY MASS INDEX DOCD: CPT | Performed by: FAMILY MEDICINE

## 2020-03-16 PROCEDURE — 99213 OFFICE O/P EST LOW 20 MIN: CPT | Performed by: FAMILY MEDICINE

## 2020-03-16 RX ORDER — KETOCONAZOLE 20 MG/G
CREAM TOPICAL DAILY
Qty: 15 G | Refills: 0 | Status: SHIPPED | OUTPATIENT
Start: 2020-03-16 | End: 2020-09-10

## 2020-03-16 NOTE — ASSESSMENT & PLAN NOTE
Possibly related to fungal infection  Recommend Nizoral cream for the next week to 2 weeks  Should be applied twice a day  If there is no improvement after 1 week, consider adding steroid cream, and consider Dermatology  As an alternate, patient could use Nizoral shampoo as a body wash

## 2020-03-16 NOTE — PATIENT INSTRUCTIONS
Problem List Items Addressed This Visit     Dermatitis - Primary     Possibly related to fungal infection  Recommend Nizoral cream for the next week to 2 weeks  Should be applied twice a day  If there is no improvement after 1 week, consider adding steroid cream, and consider Dermatology  As an alternate, patient could use Nizoral shampoo as a body wash           Relevant Medications    ketoconazole (NIZORAL) 2 % cream

## 2020-03-16 NOTE — PROGRESS NOTES
Assessment and Plan:    Problem List Items Addressed This Visit     Dermatitis - Primary     Possibly related to fungal infection  Recommend Nizoral cream for the next week to 2 weeks  Should be applied twice a day  If there is no improvement after 1 week, consider adding steroid cream, and consider Dermatology  As an alternate, patient could use Nizoral shampoo as a body wash  Relevant Medications    ketoconazole (NIZORAL) 2 % cream                 Diagnoses and all orders for this visit:    Dermatitis  -     ketoconazole (NIZORAL) 2 % cream; Apply topically daily              Subjective:      Patient ID: Vinh Nagel is a 76 y o  female  CC:    Chief Complaint   Patient presents with    Rash     Rash on neck, breasts and torso  Onset 3 weeks ago  mjs       HPI:    Patient is here for rash  Present for 3 weeks approximately  No itching, some noted on the neck,  Seems to be spreading verses initial     Rashes in several different areas  Initially was on her right side neck  None in the axilla, lower extremities  One is on the inside of the right leg  That area is small, approximately 1 cm in diameter  Patient cannot recall any new foods, clothing, detergents, no recent travel  Does not have pets  She did use cream so far, but no changes  The following portions of the patient's history were reviewed and updated as appropriate: allergies, current medications, past family history, past medical history, past social history, past surgical history and problem list       Review of Systems   Constitutional: Negative  HENT: Negative  Eyes: Negative  Respiratory: Negative  Cardiovascular: Negative  Gastrointestinal: Negative  Endocrine: Negative  Genitourinary: Negative  Musculoskeletal: Negative  Skin: Positive for rash  Allergic/Immunologic: Negative  Neurological: Negative  Hematological: Negative  Psychiatric/Behavioral: Negative            Data to review:       Objective:    Vitals:    03/16/20 1420   BP: 128/62   Pulse: 80   Temp: 98 8 °F (37 1 °C)   Weight: 52 6 kg (116 lb)   Height: 5' 1" (1 549 m)        Physical Exam   Constitutional: She appears well-developed and well-nourished  HENT:   Head: Normocephalic and atraumatic  Cardiovascular:   Pulses:       Carotid pulses are 2+ on the right side, and 2+ on the left side  Pulmonary/Chest:   Areas with rashes noted  Skin:        Nursing note and vitals reviewed

## 2020-08-06 ENCOUNTER — OFFICE VISIT (OUTPATIENT)
Dept: FAMILY MEDICINE CLINIC | Facility: CLINIC | Age: 65
End: 2020-08-06
Payer: COMMERCIAL

## 2020-08-06 VITALS
HEIGHT: 61 IN | DIASTOLIC BLOOD PRESSURE: 68 MMHG | TEMPERATURE: 98 F | HEART RATE: 80 BPM | WEIGHT: 117.8 LBS | SYSTOLIC BLOOD PRESSURE: 128 MMHG | BODY MASS INDEX: 22.24 KG/M2

## 2020-08-06 DIAGNOSIS — R07.9 CHEST PAIN, UNSPECIFIED TYPE: Primary | ICD-10-CM

## 2020-08-06 DIAGNOSIS — N94.9 ADNEXAL CYST: ICD-10-CM

## 2020-08-06 DIAGNOSIS — R53.83 OTHER FATIGUE: ICD-10-CM

## 2020-08-06 PROCEDURE — 93000 ELECTROCARDIOGRAM COMPLETE: CPT | Performed by: FAMILY MEDICINE

## 2020-08-06 PROCEDURE — 1160F RVW MEDS BY RX/DR IN RCRD: CPT | Performed by: FAMILY MEDICINE

## 2020-08-06 PROCEDURE — 3725F SCREEN DEPRESSION PERFORMED: CPT | Performed by: FAMILY MEDICINE

## 2020-08-06 PROCEDURE — 1036F TOBACCO NON-USER: CPT | Performed by: FAMILY MEDICINE

## 2020-08-06 PROCEDURE — 99214 OFFICE O/P EST MOD 30 MIN: CPT | Performed by: FAMILY MEDICINE

## 2020-08-06 PROCEDURE — 3008F BODY MASS INDEX DOCD: CPT | Performed by: FAMILY MEDICINE

## 2020-08-06 NOTE — ASSESSMENT & PLAN NOTE
Patient also has a significant amount of fatigue lately  This could be related to cardiac, therefore the stress test is most appropriate  Also, check TSH and CBC

## 2020-08-06 NOTE — ASSESSMENT & PLAN NOTE
Patient does have some anginal symptoms  Would need to make sure that this is not cardiac in nature  Check laboratory studies, including CBC, lipids, TSH  Also recommend echo stress test   As she is not having significant changes on EKG, this can be routine

## 2020-08-06 NOTE — ASSESSMENT & PLAN NOTE
Patient following with GYN before for this  Her GYN retired, so she is looking for another GYN  It does cause some discomfort at times

## 2020-08-06 NOTE — PATIENT INSTRUCTIONS
Problem List Items Addressed This Visit     Adnexal cyst     Patient following with GYN before for this  Her GYN retired, so she is looking for another GYN  It does cause some discomfort at times  Chest pain - Primary     Patient does have some anginal symptoms  Would need to make sure that this is not cardiac in nature  Check laboratory studies, including CBC, lipids, TSH  Also recommend echo stress test   As she is not having significant changes on EKG, this can be routine  Relevant Orders    Echo stress test w contrast if indicated    CBC and differential    Comprehensive metabolic panel    Lipid Panel with Direct LDL reflex    TSH, 3rd generation    XR chest pa & lateral    Fatigue     Patient also has a significant amount of fatigue lately  This could be related to cardiac, therefore the stress test is most appropriate  Also, check TSH and CBC  Relevant Orders    Echo stress test w contrast if indicated    CBC and differential    Comprehensive metabolic panel    Lipid Panel with Direct LDL reflex    TSH, 3rd generation          COVID 19 Instructions    Kristan Fuchs was advised to limit contact with others to essential tasks such as getting food, medications, and medical care  Proper handwashing reviewed, and Hand sanitzer when washing is not available  If the patient develops symptoms of COVID 19, the patient should call the office as soon as possible  Please try to download Google Duo  Once you do download this on your phone, you will be prompted to add your phone number to the account  After that, he should receive a text from [employer], and use that code to verify your phone number  After that, you should be able to use Google Duo to receive and make video calls  Please download Microsoft Teams to your phone or computer  We will be transitioning to this platform for Video Visits  Instructions for downloading this are available from the office

## 2020-08-06 NOTE — PROGRESS NOTES
Assessment and Plan:    Problem List Items Addressed This Visit     Adnexal cyst     Patient following with GYN before for this  Her GYN retired, so she is looking for another GYN  It does cause some discomfort at times  Chest pain - Primary     Patient does have some anginal symptoms  Would need to make sure that this is not cardiac in nature  Check laboratory studies, including CBC, lipids, TSH  Also recommend echo stress test   As she is not having significant changes on EKG, this can be routine  Relevant Orders    Echo stress test w contrast if indicated    CBC and differential    Comprehensive metabolic panel    Lipid Panel with Direct LDL reflex    TSH, 3rd generation    Fatigue     Patient also has a significant amount of fatigue lately  This could be related to cardiac, therefore the stress test is most appropriate  Also, check TSH and CBC  Relevant Orders    Echo stress test w contrast if indicated    CBC and differential    Comprehensive metabolic panel    Lipid Panel with Direct LDL reflex    TSH, 3rd generation                 Diagnoses and all orders for this visit:    Chest pain, unspecified type  -     Echo stress test w contrast if indicated; Future  -     CBC and differential; Future  -     Comprehensive metabolic panel; Future  -     Lipid Panel with Direct LDL reflex; Future  -     TSH, 3rd generation; Future    Other fatigue  -     Echo stress test w contrast if indicated; Future  -     CBC and differential; Future  -     Comprehensive metabolic panel; Future  -     Lipid Panel with Direct LDL reflex; Future  -     TSH, 3rd generation; Future    Adnexal cyst              Subjective:      Patient ID: Olivia Reed is a 76 y o  female      CC:    Chief Complaint   Patient presents with    Follow-up     patient in office for follow up     Fatigue     patient states she has been feeling fatigued for a couple months     Chest Pain     patient states she has been experiencing chest pain for about a week        HPI:    Patient is having some chest discomfort  It is under the left breast   It goes up into the sternum area  It is not on the surface  She does not feel it just on the skin  She did have EKG this morning when she arrived  EKG reviewed, appears normal     Patient has had symptoms since this past Saturday  Lasted for 20 min on Sat, and then comes and goes  Returned frequently initially  No SOB  No pressure or squeeze feelings  No nausea/vomit  Patient has had some significant fatigue with this  Has been going on since it started  She does have an adnexal cyst on the right  Has been seeing gyn for it  She does have recommendation from her current gyn for a follow-up office as he is currently retiring  The following portions of the patient's history were reviewed and updated as appropriate: allergies, current medications, past family history, past medical history, past social history, past surgical history and problem list       Review of Systems   Constitutional: Positive for fatigue  HENT: Negative  Eyes: Negative  Respiratory: Negative  Cardiovascular: Positive for chest pain  Gastrointestinal: Negative  Endocrine: Negative  Genitourinary: Negative  Musculoskeletal: Negative  Skin: Negative  Allergic/Immunologic: Negative  Neurological: Negative  Hematological: Negative  Psychiatric/Behavioral: Negative  Data to review:       Objective:    Vitals:    08/06/20 0939   BP: 128/68   BP Location: Left arm   Patient Position: Sitting   Cuff Size: Standard   Pulse: 80   Temp: 98 °F (36 7 °C)   TempSrc: Temporal   Weight: 53 4 kg (117 lb 12 8 oz)   Height: 5' 1" (1 549 m)        Physical Exam   Constitutional: She appears well-developed  HENT:   Head: Normocephalic and atraumatic  Cardiovascular: Normal rate, regular rhythm and normal heart sounds     Pulses:       Carotid pulses are 2+ on the right side and 2+ on the left side  Pulmonary/Chest: Effort normal and breath sounds normal  She has no wheezes  She has no rales  She exhibits no tenderness  Nursing note and vitals reviewed

## 2020-08-18 ENCOUNTER — TELEPHONE (OUTPATIENT)
Dept: FAMILY MEDICINE CLINIC | Facility: CLINIC | Age: 65
End: 2020-08-18

## 2020-08-18 NOTE — TELEPHONE ENCOUNTER
----- Message from Chaparrita Lozano MD sent at 8/18/2020 11:33 AM EDT -----  Cardio follow up   ----- Message -----  From: Felipe Ortiz LPN  Sent: 7/85/0933   9:54 AM EDT  To: Chaparrita Lozano MD    FYI:  Pt's stress echo had to be cancelled due to no [id number] review  She will be calling next week when you return to the office for follow up as to what she should do next  --bb

## 2020-08-24 ENCOUNTER — APPOINTMENT (OUTPATIENT)
Dept: LAB | Facility: MEDICAL CENTER | Age: 65
End: 2020-08-24
Payer: COMMERCIAL

## 2020-08-24 DIAGNOSIS — R53.83 OTHER FATIGUE: ICD-10-CM

## 2020-08-24 DIAGNOSIS — R07.9 CHEST PAIN, UNSPECIFIED TYPE: ICD-10-CM

## 2020-08-24 LAB
ALBUMIN SERPL BCP-MCNC: 4.1 G/DL (ref 3.5–5)
ALP SERPL-CCNC: 61 U/L (ref 46–116)
ALT SERPL W P-5'-P-CCNC: 22 U/L (ref 12–78)
ANION GAP SERPL CALCULATED.3IONS-SCNC: 4 MMOL/L (ref 4–13)
AST SERPL W P-5'-P-CCNC: 10 U/L (ref 5–45)
BASOPHILS # BLD AUTO: 0.01 THOUSANDS/ΜL (ref 0–0.1)
BASOPHILS NFR BLD AUTO: 0 % (ref 0–1)
BILIRUB SERPL-MCNC: 0.53 MG/DL (ref 0.2–1)
BUN SERPL-MCNC: 26 MG/DL (ref 5–25)
CALCIUM SERPL-MCNC: 9.6 MG/DL (ref 8.3–10.1)
CHLORIDE SERPL-SCNC: 109 MMOL/L (ref 100–108)
CHOLEST SERPL-MCNC: 213 MG/DL (ref 50–200)
CO2 SERPL-SCNC: 29 MMOL/L (ref 21–32)
CREAT SERPL-MCNC: 0.81 MG/DL (ref 0.6–1.3)
EOSINOPHIL # BLD AUTO: 0.38 THOUSAND/ΜL (ref 0–0.61)
EOSINOPHIL NFR BLD AUTO: 5 % (ref 0–6)
ERYTHROCYTE [DISTWIDTH] IN BLOOD BY AUTOMATED COUNT: 13.6 % (ref 11.6–15.1)
GFR SERPL CREATININE-BSD FRML MDRD: 76 ML/MIN/1.73SQ M
GLUCOSE P FAST SERPL-MCNC: 78 MG/DL (ref 65–99)
HCT VFR BLD AUTO: 48.1 % (ref 34.8–46.1)
HDLC SERPL-MCNC: 48 MG/DL
HGB BLD-MCNC: 15 G/DL (ref 11.5–15.4)
IMM GRANULOCYTES # BLD AUTO: 0.02 THOUSAND/UL (ref 0–0.2)
IMM GRANULOCYTES NFR BLD AUTO: 0 % (ref 0–2)
LDLC SERPL CALC-MCNC: 142 MG/DL (ref 0–100)
LYMPHOCYTES # BLD AUTO: 3.17 THOUSANDS/ΜL (ref 0.6–4.47)
LYMPHOCYTES NFR BLD AUTO: 38 % (ref 14–44)
MCH RBC QN AUTO: 28 PG (ref 26.8–34.3)
MCHC RBC AUTO-ENTMCNC: 31.2 G/DL (ref 31.4–37.4)
MCV RBC AUTO: 90 FL (ref 82–98)
MONOCYTES # BLD AUTO: 0.51 THOUSAND/ΜL (ref 0.17–1.22)
MONOCYTES NFR BLD AUTO: 6 % (ref 4–12)
NEUTROPHILS # BLD AUTO: 4.36 THOUSANDS/ΜL (ref 1.85–7.62)
NEUTS SEG NFR BLD AUTO: 51 % (ref 43–75)
NRBC BLD AUTO-RTO: 0 /100 WBCS
PLATELET # BLD AUTO: 265 THOUSANDS/UL (ref 149–390)
PMV BLD AUTO: 9.7 FL (ref 8.9–12.7)
POTASSIUM SERPL-SCNC: 3.9 MMOL/L (ref 3.5–5.3)
PROT SERPL-MCNC: 7.6 G/DL (ref 6.4–8.2)
RBC # BLD AUTO: 5.35 MILLION/UL (ref 3.81–5.12)
SODIUM SERPL-SCNC: 142 MMOL/L (ref 136–145)
TRIGL SERPL-MCNC: 117 MG/DL
TSH SERPL DL<=0.05 MIU/L-ACNC: 2.85 UIU/ML (ref 0.36–3.74)
WBC # BLD AUTO: 8.45 THOUSAND/UL (ref 4.31–10.16)

## 2020-08-24 PROCEDURE — 84443 ASSAY THYROID STIM HORMONE: CPT

## 2020-08-24 PROCEDURE — 36415 COLL VENOUS BLD VENIPUNCTURE: CPT

## 2020-08-24 PROCEDURE — 85025 COMPLETE CBC W/AUTO DIFF WBC: CPT

## 2020-08-24 PROCEDURE — 80061 LIPID PANEL: CPT

## 2020-08-24 PROCEDURE — 80053 COMPREHEN METABOLIC PANEL: CPT

## 2020-09-10 ENCOUNTER — OFFICE VISIT (OUTPATIENT)
Dept: FAMILY MEDICINE CLINIC | Facility: CLINIC | Age: 65
End: 2020-09-10
Payer: COMMERCIAL

## 2020-09-10 VITALS
BODY MASS INDEX: 22.66 KG/M2 | WEIGHT: 120 LBS | TEMPERATURE: 97.8 F | DIASTOLIC BLOOD PRESSURE: 68 MMHG | HEART RATE: 64 BPM | HEIGHT: 61 IN | SYSTOLIC BLOOD PRESSURE: 128 MMHG

## 2020-09-10 DIAGNOSIS — Z12.39 SCREENING FOR BREAST CANCER: Primary | ICD-10-CM

## 2020-09-10 DIAGNOSIS — R53.83 OTHER FATIGUE: ICD-10-CM

## 2020-09-10 DIAGNOSIS — E78.2 MIXED HYPERLIPIDEMIA: ICD-10-CM

## 2020-09-10 PROBLEM — E78.5 HYPERLIPIDEMIA: Status: ACTIVE | Noted: 2020-09-10

## 2020-09-10 PROCEDURE — 3288F FALL RISK ASSESSMENT DOCD: CPT | Performed by: FAMILY MEDICINE

## 2020-09-10 PROCEDURE — 99213 OFFICE O/P EST LOW 20 MIN: CPT | Performed by: FAMILY MEDICINE

## 2020-09-10 PROCEDURE — 1036F TOBACCO NON-USER: CPT | Performed by: FAMILY MEDICINE

## 2020-09-10 PROCEDURE — 1101F PT FALLS ASSESS-DOCD LE1/YR: CPT | Performed by: FAMILY MEDICINE

## 2020-09-10 PROCEDURE — 1160F RVW MEDS BY RX/DR IN RCRD: CPT | Performed by: FAMILY MEDICINE

## 2020-09-10 NOTE — ASSESSMENT & PLAN NOTE
Patient's cholesterol is clearly elevated  We did discuss the possibility of going on statins  Patient prefers to not use medications  Would recommend that she limit cholesterol intake by decreasing fried foods, fatty foods, beef, pork  Increase exercise  She could use red rice yeast over-the-counter, but it has exactly the same side effects and issues as statins  Fish oils to raise the good cholesterol would be quite reasonable  This is available as capsules, or as eating the fishes such as salmon, swordfish, mackeral, blue fish, tuna

## 2020-09-10 NOTE — PROGRESS NOTES
Assessment and Plan:    Problem List Items Addressed This Visit     Fatigue     Patient was not able to get the stress test secondary to cost   Insurance was being a problem with this  Hyperlipidemia     Patient's cholesterol is clearly elevated  We did discuss the possibility of going on statins  Patient prefers to not use medications  Would recommend that she limit cholesterol intake by decreasing fried foods, fatty foods, beef, pork  Increase exercise  She could use red rice yeast over-the-counter, but it has exactly the same side effects and issues as statins  Fish oils to raise the good cholesterol would be quite reasonable  This is available as capsules, or as eating the fishes such as salmon, swordfish, mackeral, blue fish, tuna  Relevant Orders    Comprehensive metabolic panel    Lipid panel      Other Visit Diagnoses     Screening for breast cancer    -  Primary    Relevant Orders    Mammo screening bilateral w 3d & cad                 Diagnoses and all orders for this visit:    Screening for breast cancer  -     Mammo screening bilateral w 3d & cad; Future    Mixed hyperlipidemia  -     Comprehensive metabolic panel; Future  -     Lipid panel; Future    Other fatigue              Subjective:      Patient ID: Cleo Flores is a 76 y o  female  CC:    Chief Complaint   Patient presents with    Follow-up     1 month follow up   [facility]       HPI:    Patient is here to follow-up on laboratory studies  She did have lipid panel that showed an abnormality  The remainder of the labs were quite reasonable  Fatigue:  Patient did not get the stress test as it was not covered under her insurance  Unfortunately, it was going to be a significant amount of money that she did not wish to spend at this time  She is still feeling the fatigue  Laboratory studies for this were also reviewed  Hyperlipidemia:  Reviewed labs, dietary changes    Reviewed side effects of statins as well         The following portions of the patient's history were reviewed and updated as appropriate: allergies, current medications, past family history, past medical history, past social history, past surgical history and problem list       Review of Systems   Constitutional: Negative  HENT: Negative  Eyes: Negative  Respiratory: Negative  Cardiovascular: Negative  Gastrointestinal: Negative  Endocrine: Negative  Genitourinary: Negative  Musculoskeletal: Negative  Skin: Negative  Allergic/Immunologic: Negative  Neurological: Negative  Hematological: Negative  Psychiatric/Behavioral: Negative  Data to review:   Sodium 142, potassium 3 9  Calcium 9 6  Creatinine 0 81, GFR 76  Blood sugar 78  AST 10, ALT 22  White count 8 45, hemoglobin 15, hematocrit 48 1, platelets 463  Total cholesterol 213, , HDL 48, triglycerides 117  TSH 2 850  Objective:    Vitals:    09/10/20 1631   BP: 128/68   Pulse: 64   Temp: 97 8 °F (36 6 °C)   Weight: 54 4 kg (120 lb)   Height: 5' 1" (1 549 m)        Physical Exam  Vitals signs and nursing note reviewed  Constitutional:       Appearance: Normal appearance  HENT:      Head: Normocephalic  Cardiovascular:      Rate and Rhythm: Normal rate and regular rhythm  Pulses: Normal pulses  Carotid pulses are 2+ on the right side and 2+ on the left side  Heart sounds: Normal heart sounds  No murmur  No friction rub  No gallop  Pulmonary:      Effort: Pulmonary effort is normal  No respiratory distress  Breath sounds: Normal breath sounds  No stridor  No wheezing, rhonchi or rales  Chest:      Chest wall: No tenderness  Neurological:      Mental Status: She is alert

## 2020-09-10 NOTE — ASSESSMENT & PLAN NOTE
Patient was not able to get the stress test secondary to cost   Insurance was being a problem with this

## 2020-09-10 NOTE — PATIENT INSTRUCTIONS
Problem List Items Addressed This Visit     Fatigue     Patient was not able to get the stress test secondary to cost   Insurance was being a problem with this  Hyperlipidemia     Patient's cholesterol is clearly elevated  We did discuss the possibility of going on statins  Patient prefers to not use medications  Would recommend that she limit cholesterol intake by decreasing fried foods, fatty foods, beef, pork  Increase exercise  She could use red rice yeast over-the-counter, but it has exactly the same side effects and issues as statins  Fish oils to raise the good cholesterol would be quite reasonable  This is available as capsules, or as eating the fishes such as salmon, swordfish, mackeral, blue fish, tuna  Relevant Orders    Comprehensive metabolic panel    Lipid panel      Other Visit Diagnoses     Screening for breast cancer    -  Primary    Relevant Orders    Mammo screening bilateral w 3d & cad          COVID 19 Instructions    Emeli Grant was advised to limit contact with others to essential tasks such as getting food, medications, and medical care  Proper handwashing reviewed, and Hand sanitzer when washing is not available  If the patient develops symptoms of COVID 19, the patient should call the office as soon as possible  For 6936-8229 Flu season, it is strongly recommended that Flu Vaccinations be obtained  Please try to download Google Duo  Once you do download this on your phone, you will be prompted to add your phone number to the account  After that, he should receive a text from [employer], and use that code to verify your phone number  After that, you should be able to use Google Duo to receive and make video calls  Please download Microsoft Teams to your phone or computer  We will be transitioning to this platform for Video Visits  Instructions for downloading this are available from the office

## 2021-05-05 ENCOUNTER — OFFICE VISIT (OUTPATIENT)
Dept: FAMILY MEDICINE CLINIC | Facility: CLINIC | Age: 66
End: 2021-05-05
Payer: COMMERCIAL

## 2021-05-05 VITALS
WEIGHT: 118 LBS | HEIGHT: 61 IN | DIASTOLIC BLOOD PRESSURE: 82 MMHG | SYSTOLIC BLOOD PRESSURE: 110 MMHG | HEART RATE: 80 BPM | BODY MASS INDEX: 22.28 KG/M2

## 2021-05-05 DIAGNOSIS — K21.9 GASTROESOPHAGEAL REFLUX DISEASE, UNSPECIFIED WHETHER ESOPHAGITIS PRESENT: ICD-10-CM

## 2021-05-05 DIAGNOSIS — R14.0 ABDOMINAL BLOATING: ICD-10-CM

## 2021-05-05 DIAGNOSIS — Z12.11 SCREEN FOR COLON CANCER: Primary | ICD-10-CM

## 2021-05-05 PROCEDURE — 3008F BODY MASS INDEX DOCD: CPT | Performed by: FAMILY MEDICINE

## 2021-05-05 PROCEDURE — 1160F RVW MEDS BY RX/DR IN RCRD: CPT | Performed by: FAMILY MEDICINE

## 2021-05-05 PROCEDURE — 1036F TOBACCO NON-USER: CPT | Performed by: FAMILY MEDICINE

## 2021-05-05 PROCEDURE — 99213 OFFICE O/P EST LOW 20 MIN: CPT | Performed by: FAMILY MEDICINE

## 2021-05-05 RX ORDER — PANTOPRAZOLE SODIUM 40 MG/1
40 TABLET, DELAYED RELEASE ORAL
Qty: 30 TABLET | Refills: 0 | Status: SHIPPED | OUTPATIENT
Start: 2021-05-05 | End: 2021-06-02

## 2021-05-05 NOTE — PATIENT INSTRUCTIONS
Problem List Items Addressed This Visit     Abdominal bloating     Patient does have some issues with abdominal pain  I would recommend that she have US and labs  Follow up after that  Consider GI follow up  Can also try PPI  Patient has been on omeprazole daily in the morning over-the-counter  It is not every day  I would recommend that we try Protonix, 40 mg daily for at least the next month and see if that makes any difference  Relevant Medications    pantoprazole (PROTONIX) 40 mg tablet    Other Relevant Orders    US abdomen complete    Comprehensive metabolic panel    CBC and differential    Amylase    Lipase    Esophageal reflux    Relevant Medications    pantoprazole (PROTONIX) 40 mg tablet      Other Visit Diagnoses     Screen for colon cancer    -  Primary    Relevant Orders    Ambulatory referral for colonoscopy          COVID 19 Instructions    Cleo Flores was advised to limit contact with others to essential tasks such as getting food, medications, and medical care  Proper handwashing reviewed, and Hand sanitzer when washing is not available  If the patient develops symptoms of COVID 19, the patient should call the office as soon as possible  For 6411-4806 Flu season, it is strongly recommended that Flu Vaccinations be obtained  Please try to download Google Duo  Once you do download this on your phone, you will be prompted to add your phone number to the account  After that, he should receive a text from [employer], and use that code to verify your phone number  After that, you should be able to use [employer] Duo to receive and make video calls  Please download Microsoft Teams to your phone or computer  We will be transitioning to this platform for Video Visits  Instructions for downloading this are available from the office      We are committed to getting you vaccinated as soon as possible and will be closely following CDC and [facility]  guidelines as they are released and revised  Please refer to our COVID-19 vaccine webpage for the most up to date information on the vaccine and our distribution efforts      KosherEduard tn

## 2021-05-05 NOTE — PROGRESS NOTES
Assessment and Plan:    Problem List Items Addressed This Visit     Abdominal bloating     Patient does have some issues with abdominal pain  I would recommend that she have US and labs  Follow up after that  Consider GI follow up  Can also try PPI  Patient has been on omeprazole daily in the morning over-the-counter  It is not every day  I would recommend that we try Protonix, 40 mg daily for at least the next month and see if that makes any difference  Relevant Medications    pantoprazole (PROTONIX) 40 mg tablet    Other Relevant Orders    US abdomen complete    Comprehensive metabolic panel    CBC and differential    Amylase    Lipase    Esophageal reflux    Relevant Medications    pantoprazole (PROTONIX) 40 mg tablet      Other Visit Diagnoses     Screen for colon cancer    -  Primary    Relevant Orders    Ambulatory referral for colonoscopy                 Diagnoses and all orders for this visit:    Screen for colon cancer  -     Ambulatory referral for colonoscopy; Future    Abdominal bloating  -     US abdomen complete; Future  -     Comprehensive metabolic panel; Future  -     CBC and differential; Future  -     Amylase; Future  -     Lipase; Future  -     pantoprazole (PROTONIX) 40 mg tablet; Take 1 tablet (40 mg total) by mouth daily before breakfast    Gastroesophageal reflux disease, unspecified whether esophagitis present  -     pantoprazole (PROTONIX) 40 mg tablet; Take 1 tablet (40 mg total) by mouth daily before breakfast              Subjective:      Patient ID: Vernoica Diaz is a 78 y o  female  CC:    Chief Complaint   Patient presents with    Bloated     c/o stomach is bloated and gas  Sometimes get diarrhea or constipation  Onset 1-2 months  Tried Dora olvera       HPI:    Patient is here because she is having some issues with stomach  Please see chief complaint  Diarrhea and constipation with some bloating    When she eats, she has increased bloating, then gas   Not pain, just feeling of fullness, and increased flatus  The following portions of the patient's history were reviewed and updated as appropriate: allergies, current medications, past family history, past medical history, past social history, past surgical history and problem list       Review of Systems   Constitutional: Negative  HENT: Negative  Eyes: Negative  Respiratory: Negative  Cardiovascular: Negative  Gastrointestinal: Positive for abdominal pain, constipation and diarrhea  Bloating   Endocrine: Negative  Genitourinary: Negative  Musculoskeletal: Negative  Skin: Negative  Allergic/Immunologic: Negative  Neurological: Negative  Hematological: Negative  Psychiatric/Behavioral: Negative  Data to review:       Objective:    Vitals:    05/05/21 1538   BP: 110/82   Pulse: 80   Weight: 53 5 kg (118 lb)   Height: 5' 1" (1 549 m)        Physical Exam  Vitals signs and nursing note reviewed  Constitutional:       Appearance: Normal appearance  HENT:      Head: Normocephalic  Cardiovascular:      Rate and Rhythm: Normal rate and regular rhythm  Pulses: Normal pulses  Carotid pulses are 2+ on the right side and 2+ on the left side  Heart sounds: Normal heart sounds  No murmur  No friction rub  No gallop  Pulmonary:      Effort: Pulmonary effort is normal  No respiratory distress  Breath sounds: Normal breath sounds  No stridor  No wheezing, rhonchi or rales  Chest:      Chest wall: No tenderness  Abdominal:      General: Abdomen is flat  Bowel sounds are normal  There is no distension  Palpations: Abdomen is soft  There is no mass  Tenderness: There is no abdominal tenderness  There is no right CVA tenderness, left CVA tenderness, guarding or rebound  Hernia: No hernia is present  Neurological:      Mental Status: She is alert

## 2021-05-05 NOTE — ASSESSMENT & PLAN NOTE
Patient does have some issues with abdominal pain  I would recommend that she have US and labs  Follow up after that  Consider GI follow up  Can also try PPI  Patient has been on omeprazole daily in the morning over-the-counter  It is not every day  I would recommend that we try Protonix, 40 mg daily for at least the next month and see if that makes any difference

## 2021-05-08 ENCOUNTER — APPOINTMENT (OUTPATIENT)
Dept: LAB | Facility: MEDICAL CENTER | Age: 66
End: 2021-05-08
Payer: COMMERCIAL

## 2021-05-08 DIAGNOSIS — R14.0 ABDOMINAL BLOATING: ICD-10-CM

## 2021-05-08 LAB
ALBUMIN SERPL BCP-MCNC: 4.1 G/DL (ref 3.5–5)
ALP SERPL-CCNC: 66 U/L (ref 46–116)
ALT SERPL W P-5'-P-CCNC: 29 U/L (ref 12–78)
AMYLASE SERPL-CCNC: 53 IU/L (ref 25–115)
ANION GAP SERPL CALCULATED.3IONS-SCNC: 4 MMOL/L (ref 4–13)
AST SERPL W P-5'-P-CCNC: 13 U/L (ref 5–45)
BASOPHILS # BLD AUTO: 0.02 THOUSANDS/ΜL (ref 0–0.1)
BASOPHILS NFR BLD AUTO: 0 % (ref 0–1)
BILIRUB SERPL-MCNC: 0.43 MG/DL (ref 0.2–1)
BUN SERPL-MCNC: 19 MG/DL (ref 5–25)
CALCIUM SERPL-MCNC: 10.2 MG/DL (ref 8.3–10.1)
CHLORIDE SERPL-SCNC: 109 MMOL/L (ref 100–108)
CO2 SERPL-SCNC: 28 MMOL/L (ref 21–32)
CREAT SERPL-MCNC: 0.73 MG/DL (ref 0.6–1.3)
EOSINOPHIL # BLD AUTO: 0.63 THOUSAND/ΜL (ref 0–0.61)
EOSINOPHIL NFR BLD AUTO: 10 % (ref 0–6)
ERYTHROCYTE [DISTWIDTH] IN BLOOD BY AUTOMATED COUNT: 13.2 % (ref 11.6–15.1)
GFR SERPL CREATININE-BSD FRML MDRD: 85 ML/MIN/1.73SQ M
GLUCOSE P FAST SERPL-MCNC: 92 MG/DL (ref 65–99)
HCT VFR BLD AUTO: 48.6 % (ref 34.8–46.1)
HGB BLD-MCNC: 15.3 G/DL (ref 11.5–15.4)
IMM GRANULOCYTES # BLD AUTO: 0.01 THOUSAND/UL (ref 0–0.2)
IMM GRANULOCYTES NFR BLD AUTO: 0 % (ref 0–2)
LIPASE SERPL-CCNC: 133 U/L (ref 73–393)
LYMPHOCYTES # BLD AUTO: 2.69 THOUSANDS/ΜL (ref 0.6–4.47)
LYMPHOCYTES NFR BLD AUTO: 43 % (ref 14–44)
MCH RBC QN AUTO: 28.3 PG (ref 26.8–34.3)
MCHC RBC AUTO-ENTMCNC: 31.5 G/DL (ref 31.4–37.4)
MCV RBC AUTO: 90 FL (ref 82–98)
MONOCYTES # BLD AUTO: 0.32 THOUSAND/ΜL (ref 0.17–1.22)
MONOCYTES NFR BLD AUTO: 5 % (ref 4–12)
NEUTROPHILS # BLD AUTO: 2.6 THOUSANDS/ΜL (ref 1.85–7.62)
NEUTS SEG NFR BLD AUTO: 42 % (ref 43–75)
NRBC BLD AUTO-RTO: 0 /100 WBCS
PLATELET # BLD AUTO: 276 THOUSANDS/UL (ref 149–390)
PMV BLD AUTO: 9.2 FL (ref 8.9–12.7)
POTASSIUM SERPL-SCNC: 4.2 MMOL/L (ref 3.5–5.3)
PROT SERPL-MCNC: 7.9 G/DL (ref 6.4–8.2)
RBC # BLD AUTO: 5.41 MILLION/UL (ref 3.81–5.12)
SODIUM SERPL-SCNC: 141 MMOL/L (ref 136–145)
WBC # BLD AUTO: 6.27 THOUSAND/UL (ref 4.31–10.16)

## 2021-05-08 PROCEDURE — 80053 COMPREHEN METABOLIC PANEL: CPT

## 2021-05-08 PROCEDURE — 85025 COMPLETE CBC W/AUTO DIFF WBC: CPT

## 2021-05-08 PROCEDURE — 82150 ASSAY OF AMYLASE: CPT

## 2021-05-08 PROCEDURE — 83690 ASSAY OF LIPASE: CPT

## 2021-05-08 PROCEDURE — 36415 COLL VENOUS BLD VENIPUNCTURE: CPT

## 2021-05-27 ENCOUNTER — HOSPITAL ENCOUNTER (OUTPATIENT)
Dept: ULTRASOUND IMAGING | Facility: MEDICAL CENTER | Age: 66
Discharge: HOME | End: 2021-05-27
Payer: COMMERCIAL

## 2021-05-27 DIAGNOSIS — R14.0 ABDOMINAL BLOATING: ICD-10-CM

## 2021-05-27 PROCEDURE — 76700 US EXAM ABDOM COMPLETE: CPT

## 2021-06-02 DIAGNOSIS — R14.0 ABDOMINAL BLOATING: ICD-10-CM

## 2021-06-02 DIAGNOSIS — K21.9 GASTROESOPHAGEAL REFLUX DISEASE, UNSPECIFIED WHETHER ESOPHAGITIS PRESENT: ICD-10-CM

## 2021-06-02 RX ORDER — PANTOPRAZOLE SODIUM 40 MG/1
TABLET, DELAYED RELEASE ORAL
Qty: 30 TABLET | Refills: 0 | Status: SHIPPED | OUTPATIENT
Start: 2021-06-02 | End: 2021-07-02

## 2021-06-09 ENCOUNTER — RA CDI HCC (OUTPATIENT)
Dept: OTHER | Facility: HOSPITAL | Age: 66
End: 2021-06-09

## 2021-06-09 NOTE — PROGRESS NOTES
[facility]          Chart reviewed, no opportunity found: CHART REVIEWED, NO OPPORTUNITY FOUND                     Patients insurance company:  [employer] [facility] (Medicare Advantage and Commercial)

## 2021-07-02 ENCOUNTER — RA CDI HCC (OUTPATIENT)
Dept: OTHER | Facility: HOSPITAL | Age: 66
End: 2021-07-02

## 2021-07-02 DIAGNOSIS — R14.0 ABDOMINAL BLOATING: ICD-10-CM

## 2021-07-02 DIAGNOSIS — K21.9 GASTROESOPHAGEAL REFLUX DISEASE, UNSPECIFIED WHETHER ESOPHAGITIS PRESENT: ICD-10-CM

## 2021-07-02 RX ORDER — PANTOPRAZOLE SODIUM 40 MG/1
TABLET, DELAYED RELEASE ORAL
Qty: 30 TABLET | Refills: 0 | Status: SHIPPED | OUTPATIENT
Start: 2021-07-02 | End: 2021-08-03

## 2021-07-02 NOTE — PROGRESS NOTES
[facility]          Chart reviewed, no opportunity found: CHART REVIEWED, NO OPPORTUNITY FOUND                     Patients insurance company:  [employer] [facility] (Medicare Advantage and Commercial)

## 2021-07-12 ENCOUNTER — OFFICE VISIT (OUTPATIENT)
Dept: FAMILY MEDICINE CLINIC | Facility: CLINIC | Age: 66
End: 2021-07-12
Payer: COMMERCIAL

## 2021-07-12 VITALS
SYSTOLIC BLOOD PRESSURE: 112 MMHG | DIASTOLIC BLOOD PRESSURE: 66 MMHG | BODY MASS INDEX: 21.97 KG/M2 | TEMPERATURE: 96.9 F | WEIGHT: 116.38 LBS | HEART RATE: 72 BPM | HEIGHT: 61 IN

## 2021-07-12 DIAGNOSIS — R14.0 ABDOMINAL BLOATING: ICD-10-CM

## 2021-07-12 DIAGNOSIS — Z78.0 POST-MENOPAUSE: Primary | ICD-10-CM

## 2021-07-12 PROCEDURE — 3288F FALL RISK ASSESSMENT DOCD: CPT | Performed by: FAMILY MEDICINE

## 2021-07-12 PROCEDURE — 1101F PT FALLS ASSESS-DOCD LE1/YR: CPT | Performed by: FAMILY MEDICINE

## 2021-07-12 PROCEDURE — 1160F RVW MEDS BY RX/DR IN RCRD: CPT | Performed by: FAMILY MEDICINE

## 2021-07-12 PROCEDURE — 99213 OFFICE O/P EST LOW 20 MIN: CPT | Performed by: FAMILY MEDICINE

## 2021-07-12 PROCEDURE — 1036F TOBACCO NON-USER: CPT | Performed by: FAMILY MEDICINE

## 2021-07-12 PROCEDURE — 3008F BODY MASS INDEX DOCD: CPT | Performed by: FAMILY MEDICINE

## 2021-07-12 PROCEDURE — 3725F SCREEN DEPRESSION PERFORMED: CPT | Performed by: FAMILY MEDICINE

## 2021-07-12 RX ORDER — ESTRADIOL 0.04 MG/D
1 FILM, EXTENDED RELEASE TRANSDERMAL 2 TIMES WEEKLY
Qty: 8 PATCH | Refills: 5 | Status: SHIPPED | OUTPATIENT
Start: 2021-07-12 | End: 2022-05-18

## 2021-07-12 NOTE — PATIENT INSTRUCTIONS
Problem List Items Addressed This Visit     Abdominal bloating    Post-menopause - Primary     Follow with GYN  No changes             Relevant Medications    Minivelle 0 0375 MG/24HR    Other Relevant Orders    Ambulatory referral to Obstetrics / Gynecology

## 2021-07-12 NOTE — PROGRESS NOTES
Assessment and Plan:    Problem List Items Addressed This Visit     Abdominal bloating    Post-menopause - Primary     Follow with GYN  No changes  Relevant Medications    Minivelle 0 0375 MG/24HR    Other Relevant Orders    Ambulatory referral to Obstetrics / Gynecology                 Diagnoses and all orders for this visit:    Post-menopause  -     Minivelle 0 0375 MG/24HR; Place 1 patch on the skin 2 (two) times a week  -     Ambulatory referral to Obstetrics / Gynecology; Future    Abdominal bloating  Comments:  resolved at this time  Follow PRN  Subjective:      Patient ID: Sade Kingsley is a 78 y o  female  CC:    Chief Complaint   Patient presents with    Follow-up     discuss test results  mgb       HPI:    Patient here to follow up about bloating  She is noting significantly less  Feels much better than she did  She would like Minivelle patch from GYN  She is using from GYN, and has still had vasomotor symptoms of menopause despite this  Reviewed labs and US from before  The following portions of the patient's history were reviewed and updated as appropriate: allergies, current medications, past family history, past medical history, past social history, past surgical history and problem list       Review of Systems   Constitutional: Negative  HENT: Negative  Eyes: Negative  Respiratory: Negative  Cardiovascular: Negative  Gastrointestinal: Negative  Endocrine: Negative  Genitourinary: Negative  Musculoskeletal: Negative  Skin: Negative  Allergic/Immunologic: Negative  Neurological: Negative  Hematological: Negative  Psychiatric/Behavioral: Negative            Data to review:       Objective:    Vitals:    07/12/21 1157   BP: 112/66   BP Location: Left arm   Patient Position: Sitting   Cuff Size: Standard   Pulse: 72   Temp: (!) 96 9 °F (36 1 °C)   TempSrc: Temporal   Weight: 52 8 kg (116 lb 6 oz)   Height: 5' 1" (1 549 m) Physical Exam  Vitals and nursing note reviewed  Constitutional:       Appearance: Normal appearance  HENT:      Head: Normocephalic  Cardiovascular:      Rate and Rhythm: Normal rate and regular rhythm  Pulses: Normal pulses  Carotid pulses are 2+ on the right side and 2+ on the left side  Heart sounds: Normal heart sounds  No murmur heard  No friction rub  No gallop  Pulmonary:      Effort: Pulmonary effort is normal  No respiratory distress  Breath sounds: Normal breath sounds  No stridor  No wheezing, rhonchi or rales  Chest:      Chest wall: No tenderness  Neurological:      Mental Status: She is alert

## 2021-08-03 DIAGNOSIS — K21.9 GASTROESOPHAGEAL REFLUX DISEASE, UNSPECIFIED WHETHER ESOPHAGITIS PRESENT: ICD-10-CM

## 2021-08-03 DIAGNOSIS — R14.0 ABDOMINAL BLOATING: ICD-10-CM

## 2021-08-03 RX ORDER — PANTOPRAZOLE SODIUM 40 MG/1
TABLET, DELAYED RELEASE ORAL
Qty: 30 TABLET | Refills: 0 | Status: SHIPPED | OUTPATIENT
Start: 2021-08-03 | End: 2021-09-02

## 2021-09-01 DIAGNOSIS — K21.9 GASTROESOPHAGEAL REFLUX DISEASE, UNSPECIFIED WHETHER ESOPHAGITIS PRESENT: ICD-10-CM

## 2021-09-01 DIAGNOSIS — R14.0 ABDOMINAL BLOATING: ICD-10-CM

## 2021-09-02 RX ORDER — PANTOPRAZOLE SODIUM 40 MG/1
TABLET, DELAYED RELEASE ORAL
Qty: 30 TABLET | Refills: 0 | Status: SHIPPED | OUTPATIENT
Start: 2021-09-02 | End: 2021-10-08

## 2021-10-07 DIAGNOSIS — R14.0 ABDOMINAL BLOATING: ICD-10-CM

## 2021-10-07 DIAGNOSIS — K21.9 GASTROESOPHAGEAL REFLUX DISEASE, UNSPECIFIED WHETHER ESOPHAGITIS PRESENT: ICD-10-CM

## 2021-10-08 RX ORDER — PANTOPRAZOLE SODIUM 40 MG/1
TABLET, DELAYED RELEASE ORAL
Qty: 90 TABLET | Refills: 1 | Status: SHIPPED | OUTPATIENT
Start: 2021-10-08

## 2021-10-20 ENCOUNTER — IMMUNIZATIONS (OUTPATIENT)
Dept: FAMILY MEDICINE CLINIC | Facility: MEDICAL CENTER | Age: 66
End: 2021-10-20

## 2021-10-20 DIAGNOSIS — Z23 ENCOUNTER FOR IMMUNIZATION: Primary | ICD-10-CM

## 2021-10-20 PROCEDURE — 91300 SARSCOV2 VAC 30MCG/0.3ML IM: CPT

## 2022-01-19 ENCOUNTER — OFFICE VISIT (OUTPATIENT)
Dept: FAMILY MEDICINE CLINIC | Facility: CLINIC | Age: 67
End: 2022-01-19
Payer: MEDICARE

## 2022-01-19 VITALS
HEIGHT: 61 IN | OXYGEN SATURATION: 97 % | BODY MASS INDEX: 22.28 KG/M2 | HEART RATE: 68 BPM | SYSTOLIC BLOOD PRESSURE: 118 MMHG | DIASTOLIC BLOOD PRESSURE: 58 MMHG | WEIGHT: 118 LBS

## 2022-01-19 DIAGNOSIS — Z13.820 OSTEOPOROSIS SCREENING: ICD-10-CM

## 2022-01-19 DIAGNOSIS — E78.2 MIXED HYPERLIPIDEMIA: Primary | ICD-10-CM

## 2022-01-19 DIAGNOSIS — K21.9 GASTROESOPHAGEAL REFLUX DISEASE, UNSPECIFIED WHETHER ESOPHAGITIS PRESENT: ICD-10-CM

## 2022-01-19 DIAGNOSIS — S83.411D SPRAIN OF MEDIAL COLLATERAL LIGAMENT OF RIGHT KNEE, SUBSEQUENT ENCOUNTER: ICD-10-CM

## 2022-01-19 DIAGNOSIS — Z12.31 ENCOUNTER FOR SCREENING MAMMOGRAM FOR MALIGNANT NEOPLASM OF BREAST: ICD-10-CM

## 2022-01-19 DIAGNOSIS — Z78.0 POST-MENOPAUSE: ICD-10-CM

## 2022-01-19 DIAGNOSIS — Z12.11 COLON CANCER SCREENING: ICD-10-CM

## 2022-01-19 DIAGNOSIS — R14.0 ABDOMINAL BLOATING: ICD-10-CM

## 2022-01-19 PROCEDURE — 1123F ACP DISCUSS/DSCN MKR DOCD: CPT | Performed by: FAMILY MEDICINE

## 2022-01-19 PROCEDURE — 99214 OFFICE O/P EST MOD 30 MIN: CPT | Performed by: FAMILY MEDICINE

## 2022-01-19 NOTE — PROGRESS NOTES
Assessment and Plan:    Problem List Items Addressed This Visit     Abdominal bloating     Minimal problems currently  Recommend follow with OV if symptoms changes  Esophageal reflux     Continues with Pantoprazole  No changes  If she has less issues after 3 months, could hold pantoprazole  Hyperlipidemia - Primary     Lipids in past have been elevated  I would recommend recheck labs  Follow after labs  Relevant Orders    Comprehensive metabolic panel    Lipid Panel with Direct LDL reflex    Post-menopause     Recommend DEXA scan  Has been OK  On patches, not cream            Relevant Orders    DXA bone density spine hip and pelvis    Sprain of MCL (medial collateral ligament) of knee     Doing well  No surgery  Other Visit Diagnoses     Encounter for screening mammogram for malignant neoplasm of breast        Due for mammogram     Relevant Orders    Mammo screening bilateral w 3d & cad    Colon cancer screening        Has family history of GI cancer, not colon  Recommend colonoscopy, but patient prefers noninvasive  Cologuard ordered as  Relevant Orders    Ambulatory referral for colonoscopy    Cologuard    Osteoporosis screening        Recommend DEXA  Calcium and Vit D supplement  Reviewed risk of hip fracture problems with hip fractures  Relevant Orders    DXA bone density spine hip and pelvis                 Diagnoses and all orders for this visit:    Mixed hyperlipidemia  -     Comprehensive metabolic panel; Future  -     Lipid Panel with Direct LDL reflex;  Future    Gastroesophageal reflux disease, unspecified whether esophagitis present    Abdominal bloating    Sprain of medial collateral ligament of right knee, subsequent encounter    Encounter for screening mammogram for malignant neoplasm of breast  Comments:  Due for mammogram   Orders:  -     Mammo screening bilateral w 3d & cad; Future    Colon cancer screening  Comments:  Has family history of GI cancer, not colon  Recommend colonoscopy, but patient prefers noninvasive  Cologuard ordered as  Orders:  -     Ambulatory referral for colonoscopy; Future  -     Cologuard    Post-menopause  -     DXA bone density spine hip and pelvis; Future    Osteoporosis screening  Comments:  Recommend DEXA  Calcium and Vit D supplement  Reviewed risk of hip fracture problems with hip fractures  Orders:  -     DXA bone density spine hip and pelvis; Future    Other orders  -     CALCIUM PO; Take by mouth  -     MAGNESIUM PO; Take by mouth              Subjective:      Patient ID: Mirtha Sprague is a 75 y o  female  CC:    Chief Complaint   Patient presents with    Follow-up     Patient present today for her 6 month follow up   Medicare Wellness Visit     Pt due  HPI:    Bloating is better  Less problems  Is avoiding lactose  GERD: Pantoprazole seems to be working for her  Using daily  In past had some issues with MCL  Currently, it is OK  She had done PT, and now really is not bothered by this  She retired in September  The following portions of the patient's history were reviewed and updated as appropriate: allergies, current medications, past family history, past medical history, past social history, past surgical history and problem list       Review of Systems   Constitutional: Negative  HENT: Negative  Eyes: Negative  Respiratory: Negative  Cardiovascular: Negative  Gastrointestinal: Negative  Endocrine: Negative  Genitourinary: Negative  Musculoskeletal: Negative  Skin: Negative  Allergic/Immunologic: Negative  Neurological: Negative  Hematological: Negative  Psychiatric/Behavioral: Negative            Data to review:       Objective:    Vitals:    01/19/22 1205   BP: 118/58   BP Location: Left arm   Patient Position: Sitting   Cuff Size: Standard   Pulse: 68   SpO2: 97%   Weight: 53 5 kg (118 lb)   Height: 5' 1" (1 549 m)        Physical Exam  Vitals and nursing note reviewed  Constitutional:       Appearance: Normal appearance  HENT:      Head: Normocephalic  Cardiovascular:      Rate and Rhythm: Normal rate and regular rhythm  Pulses: Normal pulses  Carotid pulses are 2+ on the right side and 2+ on the left side  Heart sounds: Normal heart sounds  No murmur heard  No friction rub  No gallop  Pulmonary:      Effort: Pulmonary effort is normal  No respiratory distress  Breath sounds: Normal breath sounds  No stridor  No wheezing, rhonchi or rales  Chest:      Chest wall: No tenderness  Neurological:      Mental Status: She is alert

## 2022-01-19 NOTE — PATIENT INSTRUCTIONS
Problem List Items Addressed This Visit     Abdominal bloating     Minimal problems currently  Recommend follow with OV if symptoms changes  Esophageal reflux     Continues with Pantoprazole  No changes  If she has less issues after 3 months, could hold pantoprazole  Hyperlipidemia - Primary     Lipids in past have been elevated  I would recommend recheck labs  Follow after labs  Relevant Orders    Comprehensive metabolic panel    Lipid Panel with Direct LDL reflex    Post-menopause     Recommend DEXA scan  Has been OK  On patches, not cream            Relevant Orders    DXA bone density spine hip and pelvis    Sprain of MCL (medial collateral ligament) of knee     Doing well  No surgery  Other Visit Diagnoses     Encounter for screening mammogram for malignant neoplasm of breast        Due for mammogram     Relevant Orders    Mammo screening bilateral w 3d & cad    Colon cancer screening        Has family history of GI cancer, not colon  Recommend colonoscopy, but patient prefers noninvasive  Cologuard ordered as  Relevant Orders    Ambulatory referral for colonoscopy    Cologuard    Osteoporosis screening        Recommend DEXA  Calcium and Vit D supplement  Reviewed risk of hip fracture problems with hip fractures  Relevant Orders    DXA bone density spine hip and pelvis          COVID 19 Instructions    Mirtha Floor was advised to limit contact with others to essential tasks such as getting food, medications, and medical care  Proper handwashing reviewed, and Hand sanitzer when washing is not available  If the patient develops symptoms of COVID 19, the patient should call the office as soon as possible  For 4482-8922 Flu season, it is strongly recommended that Flu Vaccinations be obtained  Virtual Visits:  Consuelo: This works on smart phones (any phone with Internet browsing capability)    You should get a text message when the provider is ready to see you  Click on the link in the text message, and the call should start  You will need to type in your name, and allow camera and microphone access  This is HIPPA compliant, and secure  If you have not already done so, get immunized to COVID 19  We are committed to getting you vaccinated as soon as possible and will be closely following CDC and [facility]  guidelines as they are released and revised  Please refer to our COVID-19 vaccine webpage for the most up to date information on the vaccine and our distribution efforts  This site will also have the most up to date recommendations for COVID booster vaccine  [employer] tn    Call [phone] ([phone]), option 7    OUR NEW LOCATION:    [address], [address] [place], [place], [address]  Fax: [phone]    Lab services and OB/GYN are at this location as well

## 2022-01-19 NOTE — PROGRESS NOTES
Assessment and Plan:     Problem List Items Addressed This Visit     None           Preventive health issues were discussed with patient, and age appropriate screening tests were ordered as noted in patient's After Visit Summary  Personalized health advice and appropriate referrals for health education or preventive services given if needed, as noted in patient's After Visit Summary  History of Present Illness:     Patient presents for Medicare Annual Wellness visit    Patient Care Team:  Bay Reed MD as PCP - General (Family Medicine)     Problem List:     Patient Active Problem List   Diagnosis    Skin lesion of face    Numbness and tingling of left leg    Allergic rhinitis    Degenerative joint disease of knee, right    Esophageal reflux    Lipoma of left lower extremity    Sprain of MCL (medial collateral ligament) of knee    Sensation of cold in leg    Adnexal cyst    Dermatitis    Chest pain    Fatigue    Hyperlipidemia    Abdominal bloating    Post-menopause      Past Medical and Surgical History:     Past Medical History:   Diagnosis Date    Medial epicondylitis of elbow, unspecified laterality     Last Assessed: 9/26/2014    Vertigo, benign paroxysmal, unspecified laterality     Last Assessed: 1/30/2013     Past Surgical History:   Procedure Laterality Date    ABDOMINO-VAGINAL VESICAL NECK SUSPENSION      OOPHORECTOMY      Unilateral      Family History:     Family History   Problem Relation Age of Onset    Diabetes Mother     Hypertension Mother     Hyperlipidemia Mother         Pure    Cancer Father         Gastric    Coronary artery disease Maternal Grandmother     Coronary artery disease Paternal Grandfather       Social History:     Social History     Socioeconomic History    Marital status:       Spouse name: None    Number of children: None    Years of education: None    Highest education level: None   Occupational History    None   Tobacco Use    Smoking status: Never Smoker    Smokeless tobacco: Never Used    Tobacco comment: no secondhand smoke exposure   Vaping Use    Vaping Use: Never used   Substance and Sexual Activity    Alcohol use: Yes     Comment: social    Drug use: Never    Sexual activity: None   Other Topics Concern    None   Social History Narrative    Employed    Single     Social Determinants of Health     Financial Resource Strain: Not on file   Food Insecurity: Not on file   Transportation Needs: Not on file   Physical Activity: Not on file   Stress: Not on file   Social Connections: Not on file   Intimate Partner Violence: Not on file   Housing Stability: Not on file      Medications and Allergies:     Current Outpatient Medications   Medication Sig Dispense Refill    aspirin 81 MG tablet Take by mouth      CALCIUM PO Take by mouth      MAGNESIUM PO Take by mouth      pantoprazole (PROTONIX) 40 mg tablet TAKE 1 TABLET BY MOUTH EVERY DAY BEFORE BREAKFAST 90 tablet 1    Minivelle 0 0375 MG/24HR Place 1 patch on the skin 2 (two) times a week 8 patch 5    PREMARIN vaginal cream INSERT 1 GM TWICE WEEKLY FOR 4 WEEKS EVERY 3 MONTHS (Patient not taking: Reported on 1/19/2022)  0     No current facility-administered medications for this visit       No Known Allergies   Immunizations:     Immunization History   Administered Date(s) Administered    COVID-19 PFIZER VACCINE 0 3 ML IM 10/20/2021      Health Maintenance:         Topic Date Due    Breast Cancer Screening: Mammogram  01/02/2019    Colorectal Cancer Screening  03/02/2021    Hepatitis C Screening  07/12/2022 (Originally 1955)         Topic Date Due    DTaP,Tdap,and Td Vaccines (1 - Tdap) Never done    Pneumococcal Vaccine: 65+ Years (1 of 1 - PPSV23) Never done    Influenza Vaccine (1) Never done    COVID-19 Vaccine (2 - Pfizer 3-dose series) 11/10/2021      Medicare Health Risk Assessment:     /58 (BP Location: Left arm, Patient Position: Sitting, Cuff Size: Standard)   Pulse 68   Ht 5' 1" (1 549 m)   Wt 53 5 kg (118 lb)   SpO2 97%   BMI 22 30 kg/m²      Loren Holly is here for her Initial Wellness visit  Health Risk Assessment:   Patient rates overall health as very good  Patient feels that their physical health rating is same  Patient is satisfied with their life  Eyesight was rated as same  Hearing was rated as same  Patient feels that their emotional and mental health rating is same  Patients states they are never, rarely angry  Patient states they are sometimes unusually tired/fatigued  Pain experienced in the last 7 days has been none  Patient states that she has experienced no weight loss or gain in last 6 months  Fall Risk Screening: In the past year, patient has experienced: no history of falling in past year      Urinary Incontinence Screening:   Patient has not leaked urine accidently in the last six months  Home Safety:  Patient does not have trouble with stairs inside or outside of their home  Patient has working smoke alarms and has working carbon monoxide detector  Home safety hazards include: none  Nutrition:   Current diet is Regular  Medications:   Patient is currently taking over-the-counter supplements  OTC medications include: see medication list  Patient is able to manage medications  Activities of Daily Living (ADLs)/Instrumental Activities of Daily Living (IADLs):   Walk and transfer into and out of bed and chair?: Yes  Dress and groom yourself?: Yes    Bathe or shower yourself?: Yes    Feed yourself? Yes  Do your laundry/housekeeping?: Yes  Manage your money, pay your bills and track your expenses?: Yes  Make your own meals?: Yes    Do your own shopping?: Yes    Previous Hospitalizations:   Any hospitalizations or ED visits within the last 12 months?: No      Advance Care Planning:   Living will: Yes    Durable POA for healthcare:  Yes    Advanced directive: Yes    Advanced directive counseling given: Yes    Five wishes given: No      Cognitive Screening:   Provider or family/friend/caregiver concerned regarding cognition?: No    PREVENTIVE SCREENINGS      Cardiovascular Screening:    General: Screening Not Indicated and History Lipid Disorder      Diabetes Screening:     General: Screening Current      Colorectal Cancer Screening:     General: Risks and Benefits Discussed    Due for: Colonoscopy - Low Risk      Breast Cancer Screening:     General: Risks and Benefits Discussed    Due for: Mammogram        Cervical Cancer Screening:    General: Screening Not Indicated      Osteoporosis Screening:    General: Risks and Benefits Discussed    Due for: DXA Axial      Abdominal Aortic Aneurysm (AAA) Screening:        General: Screening Not Indicated      Lung Cancer Screening:     General: Screening Not Indicated      Hepatitis C Screening:    General: Screening Current    Screening, Brief Intervention, and Referral to Treatment (SBIRT)    Screening  Typical number of drinks in a day: 0  Typical number of drinks in a week: 1  Interpretation: Low risk drinking behavior      Single Item Drug Screening:  How often have you used an illegal drug (including marijuana) or a prescription medication for non-medical reasons in the past year? never    Single Item Drug Screen Score: 0  Interpretation: Negative screen for possible drug use disorder      Caprice Villa MD

## 2022-01-19 NOTE — ASSESSMENT & PLAN NOTE
Continues with Pantoprazole  No changes  If she has less issues after 3 months, could hold pantoprazole

## 2022-01-26 ENCOUNTER — TELEMEDICINE (OUTPATIENT)
Dept: FAMILY MEDICINE CLINIC | Facility: CLINIC | Age: 67
End: 2022-01-26
Payer: MEDICARE

## 2022-01-26 VITALS — BODY MASS INDEX: 22.28 KG/M2 | HEIGHT: 61 IN | WEIGHT: 118 LBS

## 2022-01-26 DIAGNOSIS — B34.9 VIRAL INFECTION, UNSPECIFIED: Primary | ICD-10-CM

## 2022-01-26 DIAGNOSIS — Z20.822 EXPOSURE TO COVID-19 VIRUS: ICD-10-CM

## 2022-01-26 PROCEDURE — U0003 INFECTIOUS AGENT DETECTION BY NUCLEIC ACID (DNA OR RNA); SEVERE ACUTE RESPIRATORY SYNDROME CORONAVIRUS 2 (SARS-COV-2) (CORONAVIRUS DISEASE [COVID-19]), AMPLIFIED PROBE TECHNIQUE, MAKING USE OF HIGH THROUGHPUT TECHNOLOGIES AS DESCRIBED BY CMS-2020-01-R: HCPCS | Performed by: FAMILY MEDICINE

## 2022-01-26 PROCEDURE — 99213 OFFICE O/P EST LOW 20 MIN: CPT | Performed by: FAMILY MEDICINE

## 2022-01-26 PROCEDURE — U0005 INFEC AGEN DETEC AMPLI PROBE: HCPCS | Performed by: FAMILY MEDICINE

## 2022-01-26 NOTE — PATIENT INSTRUCTIONS
COVID TESTING SITES    [facility] is offering drive through testing for COVID  These are the preferred testing sites  Wait times should be minimal     Beginning 1/24/2022:    [facility]:  M-TH: 8am to 12pm and 5pm to 8pm; F: 8am to 12pm; SAT: 9am to 12pm    [facility]:  M-TH: 8am to 12pm and 5pm to 8pm; F: 8am to 12pm; SAT: 9am to 12pm    [facility]: M-Th 3-8, Sat 9-1    [address] [facility]: M-F 3-7    [facility]: [address] [facility] (side of building): Monday - Friday: 3pm-7pm  Drive-up and walk-up available    The sites and times are subject to change  For the most Up to date locations and times, please visit this website:    [email or web address]      If you are not able to get to one of the drive through sites, you can go to [facility] if you have an order  There can be a long wait time for this service  When arriving at [place]:  Call the number [facility], and they will instruct you as to what to do  Below is the link to [facility] Now locations    Walk-In Locations - Skip The Wait  [facility] Now  [facility] [address] [phone])   [place] at      [facility]  [address] [facility], [address]  [facility], [address] [facility]  7:30 am to 10:30 pm  Sat  - Sun  8 am to 8 pm    [address], [address]  [phone]  Mon  - Fri  7:30 am to 10:30 pm  Sat  - Sun  8 am to 8 pm    [address] [facility]  [address] [facility], [address]  [facility], [facility] 1490  [address]  8 am to 8 pm  Sat  - Sun  8 am to 4 pm    [address]  [address] [facility], [address] [facility]  [phone]  Mon  - Fri  8 am to 4 pm  Sat  - Sun  8 am to 4 pm    [address] [facility], [address] [facility]  [phone]  Mon  - Fri  8 am to 8 pm  Sat  - Sun  8 am to 4 pm    [address]  [address], [address]  [address] Fri  8 am to 8 pm  Sat  - Sun  8 am to 4 pm    [address] [facility]  [place], [address] [facility]  [phone]  Mon  - Fri  8 am to 4 pm    [address]  [address] [facility], [address]  [phone]  Mon  - Fri  8 am to 8 pm  Sat  - Sun  8 am to 8 pm    [address]  [address] [facility], [address] [facility]  [phone]  Mon  - Fri  8 am to 8 pm    [facility]  [address] [place] [address], [address] [facility]  Mon -Fri  8 am to 8 pm  Sat - Sun  8 am to 4 pm    [address] [facility]  [address] [facility], [address]  [address] [place] [address] Fri  8 am to 8 pm  Sat  - Sun  8 am to 4 pm    [address] [place] [address] Place  [address], [address]  [phone]  Mon  - Fri  8 am to 4 pm    [address] [facility]  [address]  [phone]  Mon  - Fri  8 am to 8 pm  Sat  - Sun  8 am to 8 pm    [address] [facility]  [address]  [place], [address]  [phone]  Mon  - Fri  8 am to 8 pm  Sat  - Sun  8 am to 8 pm    [address]   [address], [address], [address]  [phone]  Mon  - Fri  7:30 am to 10:30 pm  Sat  - Sun  8 am to 8 pm    [address]  [address] [place], [place], [address]  Mon -Fri  8 am to 6 pm    [address] [facility]  [address]  [phone]  Mon  - Fri  8 am to 8 pm    [address]  [address] [facility], [address], [address]  [phone]  Mon  - Fri  8 am to 8 pm  Sat  - Sun  8 am to 4 pm  Problem List Items Addressed This Visit     None      Visit Diagnoses     Viral infection, unspecified    -  Primary    Likely just VURI  Would follow up in future  Symptomatic treatment  Had significant exposure  Relevant Orders    COVID Only- Collected at [facility] or [facility]    Exposure to COVID-19 virus        Patinet with high level exposure, despite vaccine and booster  OK for checking COVID as she also has symptoms  Relevant Orders    COVID Only- Collected at [facility] or [facility]          COVID 19 Instructions    Haresh Morin was advised to limit contact with others to essential tasks such as getting food, medications, and medical care  Proper handwashing reviewed, and Hand sanitzer when washing is not available  If the patient develops symptoms of COVID 19, the patient should call the office as soon as possible  For 6855-5167 Flu season, it is strongly recommended that Flu Vaccinations be obtained  Virtual Visits:  Consuelo: This works on smart phones (any phone with Internet browsing capability)  You should get a text message when the provider is ready to see you  Click on the link in the text message, and the call should start  You will need to type in your name, and allow camera and microphone access  This is HIPPA compliant, and secure  If you have not already done so, get immunized to COVID 19        We are committed to getting you vaccinated as soon as possible and will be closely following CDC and [facility]  guidelines as they are released and revised  Please refer to our COVID-19 vaccine webpage for the most up to date information on the vaccine and our distribution efforts  This site will also have the most up to date recommendations for COVID booster vaccine  Rebecca bowen    Call [phone] ([phone]), option 7    OUR NEW LOCATION:    [address], [address] [place], [place] [address] , [place], [address]  Fax: [phone]    Lab services and OB/GYN are at this location as well

## 2022-01-26 NOTE — PROGRESS NOTES
Virtual Regular Visit    Verification of patient location:    Patient is located in the following state in which I hold an active license { amb virtual patient location:[address]}      Assessment/Plan:    Problem List Items Addressed This Visit     None               Reason for visit is   Chief Complaint   Patient presents with    Virtual Regular Visit        Encounter provider Arti Bryan MD    Provider located at [address] [place] [address]  [id number] [place] [id number] [phone]      Recent Visits  Date Type Provider Dept   01/19/22 Office Visit Arti Bryan  [address] [facility]   Showing recent visits within past 7 days and meeting all other requirements  Future Appointments  No visits were found meeting these conditions  Showing future appointments within next 150 days and meeting all other requirements       The patient was identified by name and date of birth  Cleo Flores was informed that this is a telemedicine visit and that the visit is being conducted through {AMB VIRTUAL VISIT HGXEDB:20145}  {Telemedicine confidentiality :49563} {Telemedicine participants:30461}  She acknowledged consent and understanding of privacy and security of the video platform  The patient has agreed to participate and understands they can discontinue the visit at any time  Patient is aware this is a billable service  Subjective  Cleo Flores is a 75 y o  female ***         HPI     Past Medical History:   Diagnosis Date    Medial epicondylitis of elbow, unspecified laterality     Last Assessed: 9/26/2014    Vertigo, benign paroxysmal, unspecified laterality     Last Assessed: 1/30/2013       Past Surgical History:   Procedure Laterality Date    ABDOMINO-VAGINAL VESICAL NECK SUSPENSION      OOPHORECTOMY      Unilateral       Current Outpatient Medications   Medication Sig Dispense Refill    aspirin 81 MG tablet Take by mouth      CALCIUM PO Take by mouth      MAGNESIUM PO Take by mouth      Minivelle 0 0375 MG/24HR Place 1 patch on the skin 2 (two) times a week 8 patch 5    pantoprazole (PROTONIX) 40 mg tablet TAKE 1 TABLET BY MOUTH EVERY DAY BEFORE BREAKFAST 90 tablet 1     No current facility-administered medications for this visit  No Known Allergies    Review of Systems   Constitutional: Negative for appetite change, chills, diaphoresis, fatigue and fever  HENT: Positive for congestion  Negative for ear pain, facial swelling, postnasal drip, rhinorrhea, sinus pressure, sinus pain, sore throat, trouble swallowing and voice change  Respiratory: Positive for cough  Negative for chest tightness, shortness of breath and wheezing  Cardiovascular: Negative for chest pain  Gastrointestinal: Negative for abdominal pain, diarrhea, nausea and vomiting  Musculoskeletal: Negative for myalgias  Neurological: Positive for headaches  Video Exam    There were no vitals filed for this visit  Physical Exam     {Time Spent:14277}    VIRTUAL VISIT DISCLAIMER      Won Perez verbally agrees to participate in [employer]  Pt is aware that [employer] could be limited without vital signs or the ability to perform a full hands-on physical Dietra Najma understands she or the provider may request at any time to terminate the video visit and request the patient to seek care or treatment in person

## 2022-01-26 NOTE — PROGRESS NOTES
COVID-19 Outpatient Progress Note    Assessment/Plan:    Problem List Items Addressed This Visit     None      Visit Diagnoses     Viral infection, unspecified    -  Primary    Likely just VURI  Would follow up in future  Symptomatic treatment  Had significant exposure  Relevant Orders    COVID Only- Collected at [place] [address] or [facility]    Exposure to COVID-19 virus        Patinet with high level exposure, despite vaccine and booster  OK for checking COVID as she also has symptoms  Relevant Orders    COVID Only- Collected at [place] [address] or [facility]         Disposition:     Referred patient to centralized site to test for COVID-19  I have spent 20 minutes directly with the patient  Encounter provider Nader Gallagher MD    Provider located at [address]  [address] [address] [id number] [phone]    Recent Visits  Date Type Provider Dept   01/19/22 Office Visit Nader Gallagher MD [address] [place] [address] Primary Care   Showing recent visits within past 7 days and meeting all other requirements  Today's Visits  Date Type Provider Dept   01/26/22 Telemedicine Nader Gallagher MD [facility]   Showing today's visits and meeting all other requirements  Future Appointments  No visits were found meeting these conditions  Showing future appointments within next 150 days and meeting all other requirements     This virtual check-in was done via [address] and patient was informed that this is a secure, HIPAA-compliant platform  She agrees to proceed  Patient agrees to participate in a virtual check in via telephone or video visit instead of presenting to the office to address urgent/immediate medical needs  Patient is aware this is a billable service  After connecting through [facility], the patient was identified by name and date of birth   Arsuman Bueno was informed that this was a telemedicine visit and that the exam was being conducted confidentially over secure lines  My office door was closed  No one else was in the room  Epifanio Patrick acknowledged consent and understanding of privacy and security of the telemedicine visit  I informed the patient that I have reviewed her record in Epic and presented the opportunity for her to ask any questions regarding the visit today  The patient agreed to participate  Verification of patient location:  Patient is located in the following state in which I hold an active license: [place]    Subjective:   Epifanio Patrick is a 75 y o  female who is concerned about COVID-19  Patient's symptoms include fatigue, nasal congestion, cough and headache  Patient denies fever, chills, malaise, rhinorrhea, sore throat, anosmia, loss of taste, shortness of breath, chest tightness, abdominal pain, nausea, vomiting, diarrhea and myalgias  - Date of symptom onset: 1/25/2022  - Date of exposure: 1/23/2022      COVID-19 vaccination status: Fully vaccinated with booster    Exposure:   Contact with a person who is under investigation (PUI) for or who is positive for COVID-19 within the last 14 days?: Yes    Hospitalized recently for fever and/or lower respiratory symptoms?: No      Currently a healthcare worker that is involved in direct patient care?: No      Works in a special setting where the risk of COVID-19 transmission may be high? (this may include long-term care, correctional and residential facilities; homeless shelters; assisted-living facilities and group homes ): No      Resident in a special setting where the risk of COVID-19 transmission may be high? (this may include long-term care, correctional and residential facilities; homeless shelters; assisted-living facilities and group homes ): No      Granddaughter COVID pos  Was on Sunday was last time she had exposure  Symptoms started yesterday, but she feels much worse today        Lab Results   Component Value Date    [facility] Not Detected 10/04/2021     Past Medical History: Diagnosis Date    Medial epicondylitis of elbow, unspecified laterality     Last Assessed: 9/26/2014    Vertigo, benign paroxysmal, unspecified laterality     Last Assessed: 1/30/2013     Past Surgical History:   Procedure Laterality Date    ABDOMINO-VAGINAL VESICAL NECK SUSPENSION      OOPHORECTOMY      Unilateral     Current Outpatient Medications   Medication Sig Dispense Refill    aspirin 81 MG tablet Take by mouth      CALCIUM PO Take by mouth      MAGNESIUM PO Take by mouth      Minivelle 0 0375 MG/24HR Place 1 patch on the skin 2 (two) times a week 8 patch 5    pantoprazole (PROTONIX) 40 mg tablet TAKE 1 TABLET BY MOUTH EVERY DAY BEFORE BREAKFAST 90 tablet 1     No current facility-administered medications for this visit  No Known Allergies     Chief Complaint   Patient presents with    Virtual Regular Visit     Virtual consult for current sxs of cough, congestion, headache  Pt had exposure to covid pos on Sunday   COVID-19         Review of Systems   Constitutional: Positive for fatigue  Negative for chills and fever  HENT: Positive for congestion  Negative for rhinorrhea and sore throat  Respiratory: Positive for cough  Negative for chest tightness and shortness of breath  Gastrointestinal: Negative for abdominal pain, diarrhea, nausea and vomiting  Musculoskeletal: Negative for myalgias  Neurological: Positive for headaches  Objective:    Vitals:    01/26/22 1143   Weight: 53 5 kg (118 lb)   Height: 5' 1" (1 549 m)       Physical Exam    VIRTUAL VISIT DISCLAIMER    Janett Galicia verbally agrees to participate in [facility]  Pt is aware that [facility] could be limited without vital signs or the ability to perform a full hands-on physical Julia Moreno understands she or the provider may request at any time to terminate the video visit and request the patient to seek care or treatment in person

## 2022-02-04 ENCOUNTER — HOSPITAL ENCOUNTER (OUTPATIENT)
Dept: MAMMOGRAPHY | Facility: MEDICAL CENTER | Age: 67
Discharge: HOME | End: 2022-02-04
Payer: COMMERCIAL

## 2022-02-04 ENCOUNTER — HOSPITAL ENCOUNTER (OUTPATIENT)
Dept: BONE DENSITY | Facility: MEDICAL CENTER | Age: 67
Discharge: HOME | End: 2022-02-04
Payer: COMMERCIAL

## 2022-02-04 VITALS — WEIGHT: 118 LBS | HEIGHT: 61 IN | BODY MASS INDEX: 22.28 KG/M2

## 2022-02-04 DIAGNOSIS — Z13.820 OSTEOPOROSIS SCREENING: ICD-10-CM

## 2022-02-04 DIAGNOSIS — Z78.0 POST-MENOPAUSE: ICD-10-CM

## 2022-02-04 DIAGNOSIS — Z12.31 ENCOUNTER FOR SCREENING MAMMOGRAM FOR MALIGNANT NEOPLASM OF BREAST: ICD-10-CM

## 2022-02-04 PROCEDURE — 77067 SCR MAMMO BI INCL CAD: CPT

## 2022-02-04 PROCEDURE — 77063 BREAST TOMOSYNTHESIS BI: CPT

## 2022-02-04 PROCEDURE — 77080 DXA BONE DENSITY AXIAL: CPT

## 2022-03-01 ENCOUNTER — APPOINTMENT (OUTPATIENT)
Dept: LAB | Facility: MEDICAL CENTER | Age: 67
End: 2022-03-01
Payer: COMMERCIAL

## 2022-03-01 DIAGNOSIS — E78.2 MIXED HYPERLIPIDEMIA: ICD-10-CM

## 2022-03-01 LAB
ALBUMIN SERPL BCP-MCNC: 4.2 G/DL (ref 3.5–5)
ALP SERPL-CCNC: 67 U/L (ref 46–116)
ALT SERPL W P-5'-P-CCNC: 36 U/L (ref 12–78)
ANION GAP SERPL CALCULATED.3IONS-SCNC: 5 MMOL/L (ref 4–13)
AST SERPL W P-5'-P-CCNC: 17 U/L (ref 5–45)
BILIRUB SERPL-MCNC: 0.52 MG/DL (ref 0.2–1)
BUN SERPL-MCNC: 20 MG/DL (ref 5–25)
CALCIUM SERPL-MCNC: 10.1 MG/DL (ref 8.3–10.1)
CHLORIDE SERPL-SCNC: 112 MMOL/L (ref 100–108)
CHOLEST SERPL-MCNC: 221 MG/DL
CO2 SERPL-SCNC: 26 MMOL/L (ref 21–32)
CREAT SERPL-MCNC: 0.79 MG/DL (ref 0.6–1.3)
GFR SERPL CREATININE-BSD FRML MDRD: 77 ML/MIN/1.73SQ M
GLUCOSE P FAST SERPL-MCNC: 101 MG/DL (ref 65–99)
HDLC SERPL-MCNC: 42 MG/DL
LDLC SERPL CALC-MCNC: 137 MG/DL (ref 0–100)
POTASSIUM SERPL-SCNC: 4 MMOL/L (ref 3.5–5.3)
PROT SERPL-MCNC: 7.5 G/DL (ref 6.4–8.2)
SODIUM SERPL-SCNC: 143 MMOL/L (ref 136–145)
TRIGL SERPL-MCNC: 212 MG/DL

## 2022-03-01 PROCEDURE — 36415 COLL VENOUS BLD VENIPUNCTURE: CPT

## 2022-03-01 PROCEDURE — 80061 LIPID PANEL: CPT

## 2022-03-01 PROCEDURE — 80053 COMPREHEN METABOLIC PANEL: CPT

## 2022-05-18 ENCOUNTER — OFFICE VISIT (OUTPATIENT)
Dept: FAMILY MEDICINE CLINIC | Facility: CLINIC | Age: 67
End: 2022-05-18
Payer: COMMERCIAL

## 2022-05-18 VITALS
DIASTOLIC BLOOD PRESSURE: 64 MMHG | HEIGHT: 61 IN | TEMPERATURE: 96.9 F | WEIGHT: 121.13 LBS | HEART RATE: 64 BPM | BODY MASS INDEX: 22.87 KG/M2 | OXYGEN SATURATION: 96 % | SYSTOLIC BLOOD PRESSURE: 120 MMHG

## 2022-05-18 DIAGNOSIS — K21.9 GASTROESOPHAGEAL REFLUX DISEASE, UNSPECIFIED WHETHER ESOPHAGITIS PRESENT: ICD-10-CM

## 2022-05-18 DIAGNOSIS — E78.2 MIXED HYPERLIPIDEMIA: ICD-10-CM

## 2022-05-18 DIAGNOSIS — J40 BRONCHITIS: Primary | ICD-10-CM

## 2022-05-18 PROCEDURE — 1036F TOBACCO NON-USER: CPT | Performed by: PHYSICIAN ASSISTANT

## 2022-05-18 PROCEDURE — 99214 OFFICE O/P EST MOD 30 MIN: CPT | Performed by: PHYSICIAN ASSISTANT

## 2022-05-18 PROCEDURE — 1160F RVW MEDS BY RX/DR IN RCRD: CPT | Performed by: PHYSICIAN ASSISTANT

## 2022-05-18 PROCEDURE — 3008F BODY MASS INDEX DOCD: CPT | Performed by: PHYSICIAN ASSISTANT

## 2022-05-18 RX ORDER — AZITHROMYCIN 250 MG/1
TABLET, FILM COATED ORAL
Qty: 6 TABLET | Refills: 0 | Status: SHIPPED | OUTPATIENT
Start: 2022-05-18 | End: 2022-05-23

## 2022-05-18 RX ORDER — MEDROXYPROGESTERONE ACETATE 2.5 MG/1
2.5 TABLET ORAL DAILY
COMMUNITY
Start: 2022-02-23

## 2022-05-18 RX ORDER — BENZONATATE 200 MG/1
200 CAPSULE ORAL 3 TIMES DAILY PRN
Qty: 20 CAPSULE | Refills: 0 | Status: SHIPPED | OUTPATIENT
Start: 2022-05-18

## 2022-05-18 NOTE — PROGRESS NOTES
Assessment and Plan:  Patient Instructions     Assessment/plan:  1  Bronchitis-recommend starting Zithromax Z-Rocky as directed  Patient had negative home COVID test   She will also be given Tessalon 200 mg every 8 hours as needed for cough  Recommend continued supportive care and follow up if symptoms would worsen or if they are not better in 4-5 days  Patient verbalizes understanding and agreement with plan  2  2   GERD-presently stable with Protonix, no medication changes  3   Hyperlipidemia -status unknown  Patient will follow-up for labs and wellness visit  Continue healthy diet and exercise at this time  Problem List Items Addressed This Visit        Digestive    Esophageal reflux       Other    Hyperlipidemia      Other Visit Diagnoses     Bronchitis    -  Primary    Relevant Medications    azithromycin (Zithromax) 250 mg tablet    benzonatate (TESSALON) 200 MG capsule                 Diagnoses and all orders for this visit:    Bronchitis  -     azithromycin (Zithromax) 250 mg tablet; Take 2 tablets (500 mg total) by mouth daily for 1 day, THEN 1 tablet (250 mg total) daily for 4 days  -     benzonatate (TESSALON) 200 MG capsule; Take 1 capsule (200 mg total) by mouth as needed in the morning and 1 capsule (200 mg total) as needed at noon and 1 capsule (200 mg total) as needed in the evening for cough  Gastroesophageal reflux disease, unspecified whether esophagitis present    Mixed hyperlipidemia    Other orders  -     medroxyPROGESTERone (PROVERA) 2 5 mg tablet; Take 2 5 mg by mouth in the morning  Subjective:      Patient ID: Deysi Prescott is a 75 y o  female  CC:    Chief Complaint   Patient presents with    Cough    Nasal Congestion     Pt states sx's have been x 6 days  Neg home covid test 5/17   mgb       HPI:      HPI:  This is a 26-year-old female who presents to the office with 6 days of worsening cough and chest congestion    She has been feeling a little bit fatigued and has had a low-grade fever  She did do a home COVID test yesterday which is 5 days into her symptoms and it was negative  She is concerned as she is starting to cough up more thicker yellow to green mucus  She has been using some over-the-counter Robitussin with minimal benefit  The following portions of the patient's history were reviewed and updated as appropriate: allergies, current medications, past family history, past medical history, past social history, past surgical history and problem list       Review of Systems   Constitutional: Negative for chills, fatigue and fever  HENT: Negative for congestion, ear pain and sinus pressure  Eyes: Negative for visual disturbance  Respiratory: Negative for cough, chest tightness and shortness of breath  Cardiovascular: Negative for chest pain and palpitations  Gastrointestinal: Negative for diarrhea, nausea and vomiting  Endocrine: Negative for polyuria  Genitourinary: Negative for dysuria and frequency  Musculoskeletal: Negative for arthralgias and myalgias  Skin: Negative for pallor and rash  Neurological: Negative for dizziness, weakness, light-headedness, numbness and headaches  Psychiatric/Behavioral: Negative for agitation, behavioral problems and sleep disturbance  All other systems reviewed and are negative  Data to review:       Objective:    Vitals:    05/18/22 1047   BP: 120/64   BP Location: Left arm   Patient Position: Sitting   Cuff Size: Standard   Pulse: 64   Temp: (!) 96 9 °F (36 1 °C)   TempSrc: Temporal   SpO2: 96%   Weight: 54 9 kg (121 lb 2 oz)   Height: 5' 1" (1 549 m)        Physical Exam  Constitutional:       General: She is not in acute distress  Appearance: Normal appearance  HENT:      Head: Normocephalic and atraumatic  Right Ear: Tympanic membrane normal       Left Ear: Tympanic membrane normal       Nose: No congestion or rhinorrhea     Eyes:      Conjunctiva/sclera: Conjunctivae normal       Pupils: Pupils are equal, round, and reactive to light  Neck:      Vascular: No carotid bruit  Cardiovascular:      Rate and Rhythm: Normal rate and regular rhythm  Heart sounds: No murmur heard  Pulmonary:      Effort: Pulmonary effort is normal  No respiratory distress  Breath sounds: Normal breath sounds  Abdominal:      Palpations: Abdomen is soft  Musculoskeletal:         General: Normal range of motion  Cervical back: Normal range of motion and neck supple  No muscular tenderness  Lymphadenopathy:      Cervical: No cervical adenopathy  Skin:     General: Skin is warm  Capillary Refill: Capillary refill takes less than 2 seconds  Neurological:      General: No focal deficit present  Mental Status: She is alert and oriented to person, place, and time     Psychiatric:         Mood and Affect: Mood normal

## 2022-05-18 NOTE — PATIENT INSTRUCTIONS
Assessment/plan:  Bronchitis-recommend starting Zithromax Z-Rocky as directed  Patient had negative home COVID test   She will also be given Tessalon 200 mg every 8 hours as needed for cough  Recommend continued supportive care and follow up if symptoms would worsen or if they are not better in 4-5 days  Patient verbalizes understanding and agreement with plan  2   GERD-presently stable with Protonix, no medication changes  3   Hyperlipidemia -status unknown  Patient will follow-up for labs and wellness visit  Continue healthy diet and exercise at this time

## 2022-05-22 ENCOUNTER — OFFICE VISIT (OUTPATIENT)
Dept: URGENT CARE | Facility: MEDICAL CENTER | Age: 67
End: 2022-05-22
Payer: COMMERCIAL

## 2022-05-22 ENCOUNTER — APPOINTMENT (OUTPATIENT)
Dept: RADIOLOGY | Facility: MEDICAL CENTER | Age: 67
End: 2022-05-22
Payer: COMMERCIAL

## 2022-05-22 VITALS
HEIGHT: 61 IN | BODY MASS INDEX: 22.48 KG/M2 | OXYGEN SATURATION: 99 % | SYSTOLIC BLOOD PRESSURE: 172 MMHG | TEMPERATURE: 98.4 F | WEIGHT: 119.05 LBS | RESPIRATION RATE: 20 BRPM | DIASTOLIC BLOOD PRESSURE: 75 MMHG | HEART RATE: 80 BPM

## 2022-05-22 DIAGNOSIS — S00.31XA ABRASION OF NOSE, INITIAL ENCOUNTER: ICD-10-CM

## 2022-05-22 DIAGNOSIS — S69.92XA LEFT WRIST INJURY, INITIAL ENCOUNTER: ICD-10-CM

## 2022-05-22 DIAGNOSIS — S63.502A SPRAIN OF LEFT WRIST, INITIAL ENCOUNTER: ICD-10-CM

## 2022-05-22 DIAGNOSIS — S69.92XA LEFT WRIST INJURY, INITIAL ENCOUNTER: Primary | ICD-10-CM

## 2022-05-22 PROCEDURE — S9083 URGENT CARE CENTER GLOBAL: HCPCS | Performed by: FAMILY MEDICINE

## 2022-05-22 PROCEDURE — 73110 X-RAY EXAM OF WRIST: CPT

## 2022-05-22 PROCEDURE — 99213 OFFICE O/P EST LOW 20 MIN: CPT | Performed by: FAMILY MEDICINE

## 2022-05-22 NOTE — PATIENT INSTRUCTIONS
X-ray of left wrist reveals no fracture subluxation  There is soft tissue swelling observed in the ulnar aspect and dorsal aspect of the left hand  Patient placed in a neutral wrist brace, advised to apply ice periodically 15-30 minute as often as possible  Advised to keep left hand elevated, may continue with ibuprofen as needed  Addendum:  Right nasal abrasion noted  Advised to patient to use topical Neosporin ointment for the next 3-5 days per potential skin infection  Wrist Sprain   WHAT YOU NEED TO KNOW:   A wrist sprain happens when one or more ligaments in your wrist stretch or tear  Ligaments are tough tissues that connect bones and keep them in place, and support your joints  DISCHARGE INSTRUCTIONS:   Return to the emergency department if:   You have severe pain or swelling  Your injured wrist is red or has red streaks spreading from the injured area  You have new trouble moving your hands, fingers, or wrist     Your wrist, hand, or fingers feel cold or numb  Your fingernails turn blue or gray  Call your doctor if:   Your symptoms get worse  You have pain and swelling for more than 48 hours  You have questions or concerns about your condition or care  Medicines: You may need any of the following:  NSAIDs , such as ibuprofen, help decrease swelling, pain, and fever  NSAIDs can cause stomach bleeding or kidney problems in certain people  If you take blood thinner medicine, always ask your healthcare provider if NSAIDs are safe for you  Always read the medicine label and follow directions  Acetaminophen  decreases pain and fever  It is available without a doctor's order  Ask how much to take and how often to take it  Follow directions  Read the labels of all other medicines you are using to see if they also contain acetaminophen, or ask your doctor or pharmacist  Acetaminophen can cause liver damage if not taken correctly   Do not use more than 4 grams (4,000 milligrams) total of acetaminophen in one day  Take your medicine as directed  Contact your healthcare provider if you think your medicine is not helping or if you have side effects  Tell him or her if you are allergic to any medicine  Keep a list of the medicines, vitamins, and herbs you take  Include the amounts, and when and why you take them  Bring the list or the pill bottles to follow-up visits  Carry your medicine list with you in case of an emergency  Self-care:   Rest  your wrist for at least 48 hours  Avoid activities that cause pain  Ice  your wrist for 15 to 20 minutes every hour or as directed  Use an ice pack, or put crushed ice in a plastic bag  Cover it with a towel before you put it on your wrist  Ice helps prevent tissue damage and decreases swelling and pain  Compress  your wrist with an elastic bandage  This will help decrease swelling, support your wrist, and help it heal  Wear your wrist wrap as directed  The elastic bandage should be snug but not tight  Elevate  your wrist above the level of your heart as often as you can  This will help decrease swelling and pain  Prop your wrist on pillows or blankets to keep it elevated comfortably  Wrist support: You may need to wear a splint or cast to support your wrist and prevent more damage  Wear your splint as directed  Ask for instructions on how to bathe while you are wearing a splint or cast   Physical therapy:  Your healthcare provider may recommend that you go to physical therapy  A physical therapist teaches you exercises to help improve movement and strength, and to decrease pain  Follow up with your doctor as directed:  Write down your questions so you remember to ask them during your visits  © Copyright [employer] 2022 Information is for End User's use only and may not be sold, redistributed or otherwise used for commercial purposes   All illustrations and images included in CareNotes® are the copyrighted property of A  [employer]  or [facility]  The above information is an  only  It is not intended as medical advice for individual conditions or treatments  Talk to your doctor, nurse or pharmacist before following any medical regimen to see if it is safe and effective for you
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
## 2022-06-22 ENCOUNTER — RA CDI HCC (OUTPATIENT)
Dept: OTHER | Facility: HOSPITAL | Age: 67
End: 2022-06-22

## 2022-06-22 NOTE — PROGRESS NOTES
[address]  coding opportunities       Chart reviewed, no opportunity found: CHART REVIEWED, NO OPPORTUNITY FOUND        Patients Insurance     Medicare Insurance: Medicare

## 2022-12-28 ENCOUNTER — TELEMEDICINE (OUTPATIENT)
Dept: FAMILY MEDICINE CLINIC | Facility: CLINIC | Age: 67
End: 2022-12-28

## 2022-12-28 DIAGNOSIS — U07.1 COVID-19: Primary | ICD-10-CM

## 2022-12-28 DIAGNOSIS — K13.79 DISORDER OF UVULA: ICD-10-CM

## 2022-12-28 RX ORDER — METHYLPREDNISOLONE 4 MG/1
TABLET ORAL
Qty: 1 EACH | Refills: 0 | Status: SHIPPED | OUTPATIENT
Start: 2022-12-28 | End: 2023-01-03

## 2022-12-28 NOTE — PATIENT INSTRUCTIONS
[address]  Do not get pregnant for 3 months as discusses birth defects  Finish Medrol Dosepak  If any difficulty swallowing or breathing call 911  If no improvement in 24-48 hours call office  If chest pain shortness a breath occur report to ER  Return in 1 week if still symptoms  You must isolate for 5 days from onset of symptoms and mask for 5 days    Robbie Denise is an investigational medicine used to treat mild-to-moderate COVID-19 in adults with positive results of direct SARS-CoV-2 viral testing, and who are at high risk for progressing to severe COVID-19 including hospitalization or death, and for whom other COVID-19 treatment options authorized by the FDA are not accessible or clinically appropriate  The FDA has authorized the emergency use of molnupiravir for the treatment of mild-tomoderate COVID-19 in adults under an EUA  Robbie Denise is not authorized:  for use in people less than 25years of age  for prevention of COVID-19   for people needing hospitalization for COVID-19   for use for longer than 5 consecutive days    What is the most important information I should know about molnupiravir? Robbie Denise may cause serious side effects, including:  Robbie Denise may cause harm to your unborn baby  It is not known if molnupiravir will harm your baby if you take molnupiravir during pregnancy  Robbie Denise is not recommended for use in pregnancy  Robbie Denise has not been studied in pregnancy  Robbie Denise was studied in pregnant animals only  When molnupiravir was given to pregnant animals, molnupiravir caused harm to their unborn babies  You and your healthcare provider may decide that you should take [place] if there are no other COVID-19 treatment options authorized by the FDA that are accessible or clinically appropriate for you    If you and your healthcare provider decide that you should take molnupiravir during pregnancy, you and your healthcare provider should discuss the known and potential benefits and the potential risks of taking molnupiravir during pregnancy  For individuals who are able to become pregnant:  You should use a reliable method of birth control (contraception) consistently and correctly during treatment with molnupiravir and for 4 days after the last dose of molnupiravir  Talk to your healthcare provider about reliable birth control methods  Before starting treatment with molnupiravir your healthcare provider may do a pregnancy test to see if you are pregnant before starting treatment with molnupiravir  Tell your healthcare provider right away if you become pregnant or think you may be pregnant during treatment with molnupiravir  Pregnancy Surveillance Program:  There is a pregnancy surveillance program for individuals who take molnupiravir during pregnancy  The purpose of this program is to collect information about the health of you and your baby  Talk to your healthcare provider about how to take part in this program   If you take molnupiravir during pregnancy and you agree to participate in the pregnancy surveillance program and allow your healthcare provider to share your information with Jess Zelaya, then your healthcare provider will report your use of molnupiravir during pregnancy to [address] [place] [address],[address]  by calling [phone] or pregnancyreporting msd com  For individuals who are sexually active with partners who are able to become pregnant: It is not known if molnupiravir can affect sperm  While the risk is regarded as low, animal studies to fully assess the potential for molnupiravir to affect the babies of males treated with molnupiravir have not been completed  A reliable method of birth control (contraception) should be used consistently and correctly during treatment with molnupiravir and for at least 3 months after the last dose  The risk to sperm beyond 3 months is not known   Studies to understand the risk to sperm beyond 3 months are ongoing  Talk to your healthcare provider about reliable birth control methods  Talk to your healthcare provider if you have questions or concerns about how molnupiravir may affect sperm  How do I take molnupiravir? Take molnupiravir exactly as your healthcare provider tells you to take it  Take 4 capsules of molnupiravir every 12 hours (for example, at 8 am and at 8 pm)  Take molnupiravir for 5 days  It is important that you complete the full 5 days of treatment with molnupiravir  Do not stop taking molnupiravir before you complete the full 5 days of treatment, even if you feel better  Take molnupiravir with or without food  You should stay in isolation for as long as your healthcare provider tells you to  Talk to your healthcare provider if you are not sure about how to properly isolate while you have COVID-19  Swallow molnupiravir capsules whole  Do not open, break, or crush the capsules  If you cannot swallow capsules whole, tell your healthcare provider  What to do if you miss a dose: If it has been less than 10 hours since the missed dose, take it as soon as you remember  If it has been more than 10 hours since the missed dose, skip the missed dose and take your dose at the next scheduled time  Do not double the dose of molnupiravir to make up for a missed dose  What are the important possible side effects of molnupiravir? Possible side effects of molnupiravir are:  See, Rayna Polanco is the most important information I should know about molnupiravir?”  Diarrhea  Nausea  Dizziness    These are not all the possible side effects of molnupiravir  Not many people have taken molnupiravir  Serious and unexpected side effects may happen  This medicine is still being studied, so it is possible that all of the risks are not known at this time  What other treatment choices are there?     Like Johann De may allow for the emergency use of other medicines to treat people with COVID-19  Go to https://[employer]ias[employer]/ for more information  It is your choice to be treated or not to be treated with molnupiravir  Should you decide not to take it, it will not change your standard medical care  What if I am breastfeeding? Breastfeeding is not recommended during treatment with molnupiravir and for 4 days after the last dose of molnupiravir  If you are breastfeeding or plan to breastfeed, talk to your healthcare provider about your options and specific situation before taking molnupiravir  How do I report side effects with molnupiravir? Contact your healthcare provider if you have any side effects that bother you or do not go away  Report side effects to FDA MedWatch at www fda gov/medwatch or call 0-002-ENN-9941 (1-771.109.3812)  How should I store [address] Store molnupiravir capsules at room temperature between 68°F to 77°F (20°C to 25°C)  Keep molnupiravir and all medicines out of the reach of children and pets      Full fact sheet for patients, parents, and caregivers can be found at: [employer]

## 2022-12-28 NOTE — PROGRESS NOTES
COVID-19 Outpatient Progress Note    Assessment/Plan:  1  COVID  Partial vaccination  Symptoms 12/25/2022  PCR positive 12/27/2022  Molnupiravir  ordered 12/28/2022  Risk and benefit discussed especially increased risk of birth defects no pregnancy for 3 months  2  Uvula, edema question uvulitis secondary to above  Medrol Dosepak ordered  3  Patient instructed to isolate for 5 days from onset of symptoms and mask for 5 days  4  Call in 24-48 hours if no improvement if worsen report to ER  5  Chest pain or shortness of breath developed report to ER  6  Return in 1 week if still symptoms    Molnupiravir is an investigational medicine used to treat mild-to-moderate COVID-19 in adults with positive results of direct SARS-CoV-2 viral testing, and who are at high risk for progressing to severe COVID-19 including hospitalization or death, and for whom other COVID-19 treatment options authorized by the FDA are not accessible or clinically appropriate  The FDA has authorized the emergency use of molnupiravir for the treatment of mild-tomoderate COVID-19 in adults under an EUA  [place] is not authorized:  for use in people less than 25years of age  for prevention of COVID-19   for people needing hospitalization for COVID-19   for use for longer than 5 consecutive days    What is the most important information I should know about molnupiravir? Glenora Aurea may cause serious side effects, including:  [place] may cause harm to your unborn baby  It is not known if molnupiravir will harm your baby if you take molnupiravir during pregnancy  Glenora Aurea is not recommended for use in pregnancy  Glenora Aurea has not been studied in pregnancy  G[place] was studied in pregnant animals only  When molnupiravir was given to pregnant animals, molnupiravir caused harm to their unborn babies    You and your healthcare provider may decide that you should take molnupiravir duringpregnancy if there are no other COVID-19 treatment options authorized by the FDA that are accessible or clinically appropriate for you  If you and your healthcare provider decide that you should take molnupiravir during pregnancy, you and your healthcare provider should discuss the known and potential benefits and the potential risks of taking molnupiravir during pregnancy  For individuals who are able to become pregnant:  You should use a reliable method of birth control (contraception) consistently and correctly during treatment with molnupiravir and for 4 days after the last dose of molnupiravir  Talk to your healthcare provider about reliable birth control methods  Before starting treatment with molnupiravir your healthcare provider may do a pregnancy test to see if you are pregnant before starting treatment with molnupiravir  Tell your healthcare provider right away if you become pregnant or think you may be pregnant during treatment with molnupiravir  Pregnancy Surveillance Program:  There is a pregnancy surveillance program for individuals who take molnupiravir during pregnancy  The purpose of this program is to collect information about the health of you and your baby  Talk to your healthcare provider about how to take part in this program   If you take molnupiravir during pregnancy and you agree to participate in the pregnancy surveillance program and allow your healthcare provider to share your information with Jess Zelaya, then your healthcare provider will report your use of molnupiravir during pregnancy to [address] [place] [address],[address]  by calling [phone] or pregnancyreporting msd com  For individuals who are sexually active with partners who are able to become pregnant: It is not known if molnupiravir can affect sperm  While the risk is regarded as low, animal studies to fully assess the potential for molnupiravir to affect the babies of males treated with molnupiravir have not been completed   A reliable method of birth control (contraception) should be used consistently and correctly during treatment with molnupiravir and for at least 3 months after the last dose  The risk to sperm beyond 3 months is not known  Studies to understand the risk to sperm beyond 3 months are ongoing  Talk to your healthcare provider about reliable birth control methods  Talk to your healthcare provider if you have questions or concerns about how molnupiravir may affect sperm  How do I take molnupiravir? Take molnupiravir exactly as your healthcare provider tells you to take it  Take 4 capsules of molnupiravir every 12 hours (for example, at 8 am and at 8 pm)  Take molnupiravir for 5 days  It is important that you complete the full 5 days of treatment with molnupiravir  Do not stop taking molnupiravir before you complete the full 5 days of treatment, even if you feel better  Take molnupiravir with or without food  You should stay in isolation for as long as your healthcare provider tells you to  Talk to your healthcare provider if you are not sure about how to properly isolate while you have COVID-19  Swallow molnupiravir capsules whole  Do not open, break, or crush the capsules  If you cannot swallow capsules whole, tell your healthcare provider  What to do if you miss a dose: If it has been less than 10 hours since the missed dose, take it as soon as you remember  If it has been more than 10 hours since the missed dose, skip the missed dose and take your dose at the next scheduled time  Do not double the dose of molnupiravir to make up for a missed dose  What are the important possible side effects of molnupiravir? Possible side effects of molnupiravir are:  See, Jose Hackett is the most important information I should know about molnupiravir?”  Diarrhea  Nausea  Dizziness    These are not all the possible side effects of molnupiravir  Not many people have taken molnupiravir  Serious and unexpected side effects may happen   This medicine is still being studied, so it is possible that all of the risks are not known at this time  What other treatment choices are there? Like Shaista Pride may allow for the emergency use of other medicines to treat people with COVID-19  Go to [employer] for more information  It is your choice to be treated or not to be treated with molnupiravir  Should you decide not to take it, it will not change your standard medical care  What if I am breastfeeding? Breastfeeding is not recommended during treatment with molnupiravir and for 4 days after the last dose of molnupiravir  If you are breastfeeding or plan to breastfeed, talk to your healthcare provider about your options and specific situation before taking molnupiravir  How do I report side effects with molnupiravir? Contact your healthcare provider if you have any side effects that bother you or do not go away  Report side effects to FDA MedWatch at www [employer]/medwatch or call 3-894-UEJ-3034 (1-408.445.7183)  How should I store [address] Store molnupiravir capsules at room temperature between 68°F to 77°F (20°C to 25°C)  Keep molnupiravir and all medicines out of the reach of children and pets  Full fact sheet for patients, parents, and caregivers can be found at: [employer] au      Problem List Items Addressed This Visit    None  Visit Diagnoses     COVID-19    -  Primary    Relevant Medications    molnupiravir 200 mg capsule    Disorder of uvula        Relevant Medications    methylPREDNISolone 4 MG tablet therapy pack         Disposition:     Patient has asymptomatic or mild COVID-19 infection  Based off CDC guidelines, they were recommended to isolate for 5 days   If they are asymptomatic or symptoms are improving with no fevers in the past 24 hours, isolation may be ended followed by 5 days of wearing a mask when around othes to minimize risk of infecting others  If still have a fever or other symptoms have not improved, continue to isolate until they improve  Regardless of when they end isolation, avoid being around people who are more likely to get very sick from COVID-19 until at least day 11  Instructed patient she must isolate for 5 days onset of symptoms and mask for 5 days  Four uvula will give Medrol Dosepak  Because of Medrol will use mole new  [place] risk and benefit discussed especially risk of birth defects no pregnancy for 3 months    I have spent 15 minutes directly with the patient  Encounter provider: Crow Reddy DO     Provider located at: [address]  [address] [facility] [address] [place] [id number] [phone]     Recent Visits  No visits were found meeting these conditions  Showing recent visits within past 7 days and meeting all other requirements  Today's Visits  Date Type Provider Dept   12/28/22 Telemedicine Crow Reddy DO [facility]   Showing today's visits and meeting all other requirements  Future Appointments  No visits were found meeting these conditions  Showing future appointments within next 150 days and meeting all other requirements     This virtual check-in was done via telephone and she agrees to proceed  Patient agrees to participate in a virtual check in via telephone or video visit instead of presenting to the office to address urgent/immediate medical needs  Patient is aware this is a billable service  She acknowledged consent and understanding of privacy and security of the video platform  The patient has agreed to participate and understands they can discontinue the visit at any time  After connecting through Telephone, the patient was identified by name and date of birth   Mari Mancilla was informed that this was a telemedicine visit and that the exam was being conducted confidentially over secure lines  My office door was closed  No one else was in the room  Jodi Cabrera acknowledged consent and understanding of privacy and security of the telemedicine visit  I informed the patient that I have reviewed her record in Epic and presented the opportunity for her to ask any questions regarding the visit today  The patient agreed to participate  It was my intent to perform this visit via video technology but the patient was not able to do a video connection so the visit was completed via audio telephone only  Verification of patient location:  Patient is located in the following state in which I hold an active license: [place]    Subjective:   Jodi Cabrera is a 75 y o  female who has been screened for COVID-19  Symptom change since last report: unchanged  Patient's symptoms include nasal congestion and cough  Patient denies fever, chills, fatigue, malaise, rhinorrhea, sore throat, anosmia, loss of taste, shortness of breath, chest tightness, abdominal pain, nausea, diarrhea, myalgias and headaches  - Date of symptom onset: 12/25/2022  - Date of positive COVID-19 test: 12/27/2022  Type of test: PCR  COVID-19 vaccination status: Partially vaccinated    Araceli Daniel has been staying home and has isolated themselves in her home  She is taking care to not share personal items and is cleaning all surfaces that are touched often, like counters, tabletops, and doorknobs using household cleaning sprays or wipes  She is wearing a mask when she leaves her room  Patient started with a “cold” on Sunday  On the 27th she went to urgent care  Did a COVID test which was positive no treatment was initiated    Lab Results   Component Value Date    SARSCOV2 Negative 01/26/2022    SARSCORONAVI Detected (A) 12/27/2022       Review of Systems   Constitutional: Negative for chills, fatigue and fever  HENT: Positive for congestion  Negative for rhinorrhea and sore throat           Patient states every time she gets sick her uvula gets swollen  No difficult time breathing or swallowing   Respiratory: Positive for cough  Negative for chest tightness and shortness of breath  Gastrointestinal: Negative for abdominal pain, diarrhea and nausea  Musculoskeletal: Negative for myalgias  Neurological: Negative for headaches  Current Outpatient Medications on File Prior to Visit   Medication Sig   • aspirin 81 MG tablet Take by mouth   • benzonatate (TESSALON) 200 MG capsule Take 1 capsule (200 mg total) by mouth as needed in the morning and 1 capsule (200 mg total) as needed at noon and 1 capsule (200 mg total) as needed in the evening for cough  (Patient not taking: Reported on 5/22/2022)   • CALCIUM PO Take by mouth   • MAGNESIUM PO Take by mouth   • medroxyPROGESTERone (PROVERA) 2 5 mg tablet Take 2 5 mg by mouth in the morning  • Minivelle 0 0375 MG/24HR Place 1 patch on the skin 2 (two) times a week   • pantoprazole (PROTONIX) 40 mg tablet TAKE 1 TABLET BY MOUTH EVERY DAY BEFORE BREAKFAST       Objective: There were no vitals taken for this visit       Physical Exam  Constitutional:       Comments: Phone call, no exam       Philip Barahona, DO

## 2023-04-24 ENCOUNTER — OFFICE VISIT (OUTPATIENT)
Dept: FAMILY MEDICINE CLINIC | Facility: CLINIC | Age: 68
End: 2023-04-24

## 2023-04-24 VITALS
HEIGHT: 61 IN | BODY MASS INDEX: 23.71 KG/M2 | WEIGHT: 125.6 LBS | SYSTOLIC BLOOD PRESSURE: 126 MMHG | HEART RATE: 70 BPM | TEMPERATURE: 98.1 F | DIASTOLIC BLOOD PRESSURE: 60 MMHG | RESPIRATION RATE: 16 BRPM

## 2023-04-24 DIAGNOSIS — Z12.31 ENCOUNTER FOR SCREENING MAMMOGRAM FOR MALIGNANT NEOPLASM OF BREAST: ICD-10-CM

## 2023-04-24 DIAGNOSIS — E78.2 MIXED HYPERLIPIDEMIA: Primary | ICD-10-CM

## 2023-04-24 DIAGNOSIS — Z00.00 MEDICARE ANNUAL WELLNESS VISIT, SUBSEQUENT: ICD-10-CM

## 2023-04-24 DIAGNOSIS — K21.9 GASTROESOPHAGEAL REFLUX DISEASE, UNSPECIFIED WHETHER ESOPHAGITIS PRESENT: ICD-10-CM

## 2023-04-24 DIAGNOSIS — F43.0 STRESS REACTION: ICD-10-CM

## 2023-04-24 PROBLEM — R07.9 CHEST PAIN: Status: RESOLVED | Noted: 2020-08-06 | Resolved: 2023-04-24

## 2023-04-24 PROBLEM — R14.0 ABDOMINAL BLOATING: Status: RESOLVED | Noted: 2021-05-05 | Resolved: 2023-04-24

## 2023-04-24 PROBLEM — R20.2 NUMBNESS AND TINGLING OF LEFT LEG: Status: RESOLVED | Noted: 2018-06-22 | Resolved: 2023-04-24

## 2023-04-24 PROBLEM — R20.0 NUMBNESS AND TINGLING OF LEFT LEG: Status: RESOLVED | Noted: 2018-06-22 | Resolved: 2023-04-24

## 2023-04-24 PROBLEM — D17.24 LIPOMA OF LEFT LOWER EXTREMITY: Status: RESOLVED | Noted: 2017-10-20 | Resolved: 2023-04-24

## 2023-04-24 PROBLEM — R53.83 FATIGUE: Status: RESOLVED | Noted: 2020-08-06 | Resolved: 2023-04-24

## 2023-04-24 NOTE — PROGRESS NOTES
Assessment and Plan:     Problem List Items Addressed This Visit        Digestive    Esophageal reflux     Taking protonix as needed only and doing well  Other    Hyperlipidemia - Primary     Not currently on mediactions  She is due for labs  These were ordered  Relevant Orders    Lipid panel    Comprehensive metabolic panel    TSH, 3rd generation with Free T4 reflex   Other Visit Diagnoses     Encounter for screening mammogram for malignant neoplasm of breast        Relevant Orders    Mammo screening bilateral w 3d & cad    Medicare annual wellness visit, subsequent        Stress reaction        referral to behavioral health for therapy     Relevant Orders    Ambulatory Referral to Behavioral Health          Depression Screening and Follow-up Plan: Patient was screened for depression during today's encounter  They screened negative with a PHQ-2 score of 0  Preventive health issues were discussed with patient, and age appropriate screening tests were ordered as noted in patient's After Visit Summary  Personalized health advice and appropriate referrals for health education or preventive services given if needed, as noted in patient's After Visit Summary  History of Present Illness:     Patient presents for a Medicare Wellness Visit    Patient presents for follow up  She wants a referral to therapist because she is struggling with 15year old granddaughter who she feels is not listening and challenging to raise  The granddaughter lives with her because she has custody of her currently  Patient Care Team:  Matthew Richards MD as PCP - General (Family Medicine)     Review of Systems:     Review of Systems   Constitutional: Negative for activity change and unexpected weight change  Eyes: Negative for visual disturbance  Respiratory: Negative for chest tightness and shortness of breath  Cardiovascular: Negative for chest pain, palpitations and leg swelling     Gastrointestinal: Negative for abdominal pain  Musculoskeletal: Negative for arthralgias and back pain  Neurological: Negative for dizziness and headaches  Psychiatric/Behavioral: Negative for dysphoric mood and sleep disturbance  The patient is not nervous/anxious           Problem List:     Patient Active Problem List   Diagnosis   • Skin lesion of face   • Allergic rhinitis   • Degenerative joint disease of knee, right   • Esophageal reflux   • Sprain of MCL (medial collateral ligament) of knee   • Sensation of cold in leg   • Adnexal cyst   • Dermatitis   • Hyperlipidemia   • Post-menopause      Past Medical and Surgical History:     Past Medical History:   Diagnosis Date   • Medial epicondylitis of elbow, unspecified laterality     Last Assessed: 9/26/2014   • Vertigo, benign paroxysmal, unspecified laterality     Last Assessed: 1/30/2013     Past Surgical History:   Procedure Laterality Date   • ABDOMINO-VAGINAL VESICAL NECK SUSPENSION     • OOPHORECTOMY      Unilateral      Family History:     Family History   Problem Relation Age of Onset   • Diabetes Mother    • Hypertension Mother    • Hyperlipidemia Mother         Pure   • Cancer Father         Gastric   • Coronary artery disease Maternal Grandmother    • Coronary artery disease Paternal Grandfather    • Cancer Sister         unknown type      Social History:     Social History     Socioeconomic History   • Marital status:      Spouse name: None   • Number of children: None   • Years of education: None   • Highest education level: None   Occupational History   • None   Tobacco Use   • Smoking status: Never   • Smokeless tobacco: Never   • Tobacco comments:     no secondhand smoke exposure   Vaping Use   • Vaping Use: Never used   Substance and Sexual Activity   • Alcohol use: Yes     Comment: social   • Drug use: Never   • Sexual activity: None   Other Topics Concern   • None   Social History Narrative    Employed    Single     Social Determinants of Health Financial Resource Strain: Low Risk    • Difficulty of Paying Living Expenses: Not hard at all   Food Insecurity: Not on file   Transportation Needs: No Transportation Needs   • Lack of Transportation (Medical): No   • Lack of Transportation (Non-Medical): No   Physical Activity: Not on file   Stress: Not on file   Social Connections: Not on file   Intimate Partner Violence: Not on file   Housing Stability: Not on file      Medications and Allergies:     Current Outpatient Medications   Medication Sig Dispense Refill   • aspirin 81 MG tablet Take by mouth     • CALCIUM PO Take by mouth     • MAGNESIUM PO Take by mouth     • medroxyPROGESTERone (PROVERA) 2 5 mg tablet Take 2 5 mg by mouth in the morning  • pantoprazole (PROTONIX) 40 mg tablet TAKE 1 TABLET BY MOUTH EVERY DAY BEFORE BREAKFAST 90 tablet 1     No current facility-administered medications for this visit  No Known Allergies   Immunizations:     Immunization History   Administered Date(s) Administered   • COVID-19 PFIZER VACCINE 0 3 ML IM 10/20/2021      Health Maintenance:         Topic Date Due   • Hepatitis C Screening  Never done   • Colorectal Cancer Screening  03/02/2021   • Breast Cancer Screening: Mammogram  02/04/2023         Topic Date Due   • Pneumococcal Vaccine: 65+ Years (1 - PCV) Never done   • COVID-19 Vaccine (2 - Pfizer series) 11/10/2021   • Influenza Vaccine (1) Never done      Medicare Screening Tests and Risk Assessments:     Danilo Kraus is here for her Subsequent Wellness visit  Health Risk Assessment:   Patient rates overall health as very good  Patient feels that their physical health rating is same  Patient is satisfied with their life  Eyesight was rated as same  Hearing was rated as same  Patient feels that their emotional and mental health rating is same  Patients states they are often angry  Patient states they are never, rarely unusually tired/fatigued  Pain experienced in the last 7 days has been none   Patient states that she has experienced no weight loss or gain in last 6 months  Depression Screening:   PHQ-2 Score: 0      Fall Risk Screening: In the past year, patient has experienced: no history of falling in past year      Urinary Incontinence Screening:   Patient has not leaked urine accidently in the last six months  Home Safety:  Patient does not have trouble with stairs inside or outside of their home  Patient has working smoke alarms and has working carbon monoxide detector  Home safety hazards include: none  Nutrition:   Current diet is Regular  Medications:   Patient is currently taking over-the-counter supplements  OTC medications include: see medication list  Patient is able to manage medications  Activities of Daily Living (ADLs)/Instrumental Activities of Daily Living (IADLs):   Walk and transfer into and out of bed and chair?: Yes  Dress and groom yourself?: Yes    Bathe or shower yourself?: Yes    Feed yourself? Yes  Do your laundry/housekeeping?: Yes  Manage your money, pay your bills and track your expenses?: Yes  Make your own meals?: Yes    Do your own shopping?: Yes    Previous Hospitalizations:   Any hospitalizations or ED visits within the last 12 months?: No      Advance Care Planning:   Living will: Yes    Durable POA for healthcare:  Yes    Advanced directive: Yes      PREVENTIVE SCREENINGS      Cardiovascular Screening:    General: History Lipid Disorder and Risks and Benefits Discussed    Due for: Lipid Panel      Diabetes Screening:     General: Risks and Benefits Discussed    Due for: Blood Glucose      Colorectal Cancer Screening:     General: Screening Current    Due for: Cologuard      Breast Cancer Screening:     General: Screening Current      Cervical Cancer Screening:    General: Screening Not Indicated      Osteoporosis Screening:    General: Risks and Benefits Discussed    Due for: DXA Axial      Abdominal Aortic Aneurysm (AAA) Screening:        General: "Screening Not Indicated      Lung Cancer Screening:     General: Screening Not Indicated      Hepatitis C Screening:    General: Screening Not Indicated    Screening, Brief Intervention, and Referral to Treatment (SBIRT)    Screening  Typical number of drinks in a day: 0  Typical number of drinks in a week: 0  Interpretation: Low risk drinking behavior  AUDIT-C Screenin) How often did you have a drink containing alcohol in the past year? never  2) How many drinks did you have on a typical day when you were drinking in the past year? 0  3) How often did you have 6 or more drinks on one occasion in the past year? never    AUDIT-C Score: 0  Interpretation: Score 0-2 (female): Negative screen for alcohol misuse    Single Item Drug Screening:  How often have you used an illegal drug (including marijuana) or a prescription medication for non-medical reasons in the past year? never    Single Item Drug Screen Score: 0  Interpretation: Negative screen for possible drug use disorder    Brief Intervention  Alcohol & drug use screenings were reviewed  No concerns regarding substance use disorder identified  Other Counseling Topics:   Regular weightbearing exercise and calcium and vitamin D intake  No results found  Physical Exam:     /60 (BP Location: Left arm, Patient Position: Sitting, Cuff Size: Adult)   Pulse 70   Temp 98 1 °F (36 7 °C) (Temporal)   Resp 16   Ht 5' 1\" (1 549 m)   Wt 57 kg (125 lb 9 6 oz)   BMI 23 73 kg/m²     Physical Exam  Vitals and nursing note reviewed  Constitutional:       General: She is not in acute distress  Appearance: Normal appearance  She is normal weight  She is not ill-appearing or diaphoretic  HENT:      Head: Normocephalic and atraumatic  Right Ear: External ear normal       Left Ear: External ear normal    Eyes:      Extraocular Movements: Extraocular movements intact        Conjunctiva/sclera: Conjunctivae normal    Neck:      Thyroid: No " thyromegaly  Vascular: No carotid bruit or JVD  Cardiovascular:      Rate and Rhythm: Normal rate and regular rhythm  Heart sounds: Normal heart sounds, S1 normal and S2 normal  No murmur heard  Pulmonary:      Effort: Pulmonary effort is normal       Breath sounds: Normal breath sounds  Musculoskeletal:      Right lower leg: No edema  Left lower leg: No edema  Skin:     Coloration: Skin is not pale  Neurological:      Mental Status: She is alert and oriented to person, place, and time  Psychiatric:         Mood and Affect: Mood normal          Behavior: Behavior normal          Thought Content:  Thought content normal          Judgment: Judgment normal           TRACI Rosa

## 2023-04-24 NOTE — PATIENT INSTRUCTIONS
Rafael Romp   [facility] 97   [facility], [address] [place]    Smiley Gaucher   [address], [place] [address]     [facility]   [phone]  [address]    [address] [facility]  [address]  [address]  [facility] 64    [facility]   [address]   [address] [facility]  [phone]    [facility]  [address]   [address] behavioral [facility]   [address]  [address] [facility] [address]  [phone]    [facility]  [address]  [place] [address]  [phone]    [address]  [phone]

## 2023-05-23 ENCOUNTER — HOSPITAL ENCOUNTER (OUTPATIENT)
Dept: MAMMOGRAPHY | Facility: MEDICAL CENTER | Age: 68
Discharge: HOME | End: 2023-05-23

## 2023-05-23 VITALS — HEIGHT: 61 IN | WEIGHT: 125 LBS | BODY MASS INDEX: 23.6 KG/M2

## 2023-05-23 DIAGNOSIS — Z12.31 ENCOUNTER FOR SCREENING MAMMOGRAM FOR MALIGNANT NEOPLASM OF BREAST: ICD-10-CM

## 2023-08-14 ENCOUNTER — VBI (OUTPATIENT)
Dept: ADMINISTRATIVE | Facility: OTHER | Age: 68
End: 2023-08-14

## 2024-01-10 ENCOUNTER — OFFICE VISIT (OUTPATIENT)
Dept: FAMILY MEDICINE CLINIC | Facility: CLINIC | Age: 69
End: 2024-01-10
Payer: COMMERCIAL

## 2024-01-10 VITALS
OXYGEN SATURATION: 97 % | HEIGHT: 61 IN | DIASTOLIC BLOOD PRESSURE: 68 MMHG | RESPIRATION RATE: 16 BRPM | WEIGHT: 120 LBS | BODY MASS INDEX: 22.66 KG/M2 | HEART RATE: 108 BPM | SYSTOLIC BLOOD PRESSURE: 110 MMHG

## 2024-01-10 DIAGNOSIS — U07.1 COVID-19 VIRUS INFECTION: Primary | ICD-10-CM

## 2024-01-10 DIAGNOSIS — M25.552 PAIN OF LEFT HIP: ICD-10-CM

## 2024-01-10 PROBLEM — M25.559 HIP PAIN: Status: ACTIVE | Noted: 2024-01-10

## 2024-01-10 PROCEDURE — 99213 OFFICE O/P EST LOW 20 MIN: CPT | Performed by: FAMILY MEDICINE

## 2024-01-10 RX ORDER — NAPROXEN SODIUM 220 MG
440 TABLET ORAL 2 TIMES DAILY WITH MEALS
Start: 2024-01-10

## 2024-01-10 NOTE — ASSESSMENT & PLAN NOTE
Positive COVID home test last night.  At this point, with minimal symptoms.  Minimal risk factors for other issues.  Would recommend symptomatic treatment only unless she has increasing symptoms over the next several days.  Briefly reviewed about Paxlovid versus Molnupiravir.

## 2024-01-10 NOTE — ASSESSMENT & PLAN NOTE
Some irritation and pain in the left hip.  Recommend Aleve 2 pills twice a day.  Heat, ice, range of motion.  X-ray left hip.  Consider physical therapy.

## 2024-01-10 NOTE — PROGRESS NOTES
Name: Helena Rueda      : 1955      MRN: [id number]  Encounter Provider: Jonatan Newsome MD  Encounter Date: 1/10/2024   Encounter department: [facility]    Assessment & Plan     1. COVID-19 virus infection  Assessment & Plan:  Positive COVID home test last night.  At this point, with minimal symptoms.  Minimal risk factors for other issues.  Would recommend symptomatic treatment only unless she has increasing symptoms over the next several days.  Briefly reviewed about Paxlovid versus Molnupiravir.      2. Pain of left hip  Assessment & Plan:  Some irritation and pain in the left hip.  Recommend Aleve 2 pills twice a day.  Heat, ice, range of motion.  X-ray left hip.  Consider physical therapy.    Orders:  -     XR hip/pelv 2-3 vws left if performed; Future; Expected date: 01/10/2024  -     naproxen sodium (ALEVE) 220 MG tablet; Take 2 tablets (440 mg total) by mouth 2 (two) times a day with meals           Subjective      Patient is here because she tested positive for COVID last night at home.  Wondered if there was anything in particular to do for it.  Not having severe symptoms currently.  No shortness of breath.  Feels like a cold overall.    She also has some pain in her left hip.  Laying down is when she notices it when she moves her foot to the left or right, i.e. internal and external rotation.  Walking seems to be okay.  Has been going on for about a month now.  Nothing else in particular with this.  No medications and specific for it at this point.          Review of Systems   Constitutional:  Positive for fatigue. Negative for activity change, appetite change and fever.   HENT:  Positive for congestion, postnasal drip and rhinorrhea. Negative for sinus pressure and sinus pain.    Eyes: Negative.    Respiratory:  Positive for cough (Minimal). Negative for shortness of breath and wheezing.    Cardiovascular: Negative.    Gastrointestinal: Negative.   "  Musculoskeletal:         Per HPI       Current Outpatient Medications on File Prior to Visit   Medication Sig   • aspirin 81 MG tablet Take by mouth   • CALCIUM PO Take by mouth   • MAGNESIUM PO Take by mouth   • medroxyPROGESTERone (PROVERA) 2.5 mg tablet Take 2.5 mg by mouth in the morning.   • pantoprazole (PROTONIX) 40 mg tablet TAKE 1 TABLET BY MOUTH EVERY DAY BEFORE BREAKFAST       Objective     /68 (BP Location: Right arm, Patient Position: Sitting, Cuff Size: Large)   Pulse (!) 108   Resp 16   Ht 5' 1\" (1.549 m)   Wt 54.4 kg (120 lb)   SpO2 97%   BMI 22.67 kg/m²     Physical Exam  Vitals and nursing note reviewed.   Constitutional:       Appearance: Normal appearance. She is well-developed. She is ill-appearing.   HENT:      Head: Normocephalic and atraumatic.   Cardiovascular:      Rate and Rhythm: Normal rate and regular rhythm.      Pulses:           Carotid pulses are 2+ on the right side and 2+ on the left side.     Heart sounds: Normal heart sounds.   Pulmonary:      Effort: Pulmonary effort is normal.      Breath sounds: Normal breath sounds. No wheezing or rales.   Chest:      Chest wall: No tenderness.   Musculoskeletal:      Right hip: Normal.      Left hip: Tenderness (Some tenderness with internal and external rotation.  Recreates her symptoms.) present. No bony tenderness or crepitus. Normal range of motion.   Neurological:      Mental Status: She is alert.       Jonatan Newsome MD    "

## 2024-01-10 NOTE — PATIENT INSTRUCTIONS
1. COVID-19 virus infection  Assessment & Plan:  Positive COVID home test last night.  At this point, with minimal symptoms.  Minimal risk factors for other issues.  Would recommend symptomatic treatment only unless she has increasing symptoms over the next several days.        2. Pain of left hip  Assessment & Plan:  Some irritation and pain in the left hip.  Recommend Aleve 2 pills twice a day.  Heat, ice, range of motion.  X-ray left hip.  Consider physical therapy.          COVID 19 Instructions    Helena Rueda was advised to limit contact with others to essential tasks such as getting food, medications, and medical care.    Proper handwashing reviewed, and Hand sanitzer when washing is not available.    If the patient develops symptoms of COVID 19, the patient should call the office as soon as possible.    It is strongly recommended that Flu Vaccinations be obtained.      Virtual Visits:  [place]: This works on smart phones (any phone with Internet browsing capability).  You should get a text message when the provider is ready to see you.  Click on the link in the text message, and the call should start.  You will need to type in your name, and allow camera and microphone access.  This is HIPPA compliant, and secure.      If you have not already done so, get immunized to COVID 19.      We are committed to getting you vaccinated as soon as possible and will be closely following CDC and [facility] guidelines as they are released and revised.  Please refer to our COVID-19 vaccine webpage for the most up to date information on the vaccine and our distribution efforts.    This site will also have the most up to date recommendations for COVID booster vaccine.    [email or web address]    Call [phone] ([phone]), option 7    You can also visit https://www.vaccines.gov/ to find vaccines in your area.    OUR LOCATION:    [facility]  [address] [place] [address]  "[place], [address]  [place], [place], [address]  [phone]  Fax: [phone]    Lab services, Rheumatology, and OB/GYN are at this location as well.       FACT SHEET FOR PATIENTS, PARENTS, AND CAREGIVERS   EMERGENCY USE AUTHORIZATION (EUA) OF PAXLOVID   FOR CORONAVIRUS DISEASE 2019 (COVID-19)   You are being given this Fact Sheet because your healthcare provider believes it is necessary to provide you with PAXLOVID for the treatment of mild-to-moderate coronavirus disease (COVID-19) caused by the SARS-CoV-2 virus. This Fact Sheet contains information to help you understand the risks and benefits of taking the PAXLOVID you have received or may receive.   The U.S. Food and Drug Administration (FDA) has issued an Emergency Use Authorization (EUA) to make PAXLOVID available during the COVID-19 pandemic (for more details about an EUA please see \"What is an Emergency Use Authorization?\" at the end of this document). PAXLOVID is not an FDA-approved medicine in the [place]. Read this Fact Sheet for information about PAXLOVID. Talk to your healthcare provider about your options or if you have any questions. It is your choice to take PAXLOVID.   What is COVID-19?   COVID-19 is caused by a virus called a coronavirus. You can get COVID-19 through close contact with another person who has the virus.   COVID-19 illnesses have ranged from very mild-to-severe, including illness resulting in death. While information so far suggests that most COVID-19 illness is mild, serious illness can happen and may cause some of your other medical conditions to become worse. Older people and people of all ages with severe, long lasting (chronic) medical conditions like heart disease, lung disease, and diabetes, for example seem to be at higher risk of being hospitalized for COVID-19.   What is PAXLOVID?   PAXLOVID is an investigational medicine used to treat mild-to-moderate COVID-19 in adults and children [12 years of age and older weighing at " least 88 pounds (40 kg)] with positive results of direct SARS-CoV-2 viral testing, and who are at high risk for progression to severe COVID-19, including hospitalization or death. PAXLOVID is investigational because it is still being studied. There is limited information about the safety and effectiveness of using PAXLOVID to treat people with mild-to-moderate COVID-19.   The FDA has authorized the emergency use of PAXLOVID for the treatment of mild-to- moderate COVID-19 in adults and children [12 years of age and older weighing at least 88 pounds (40 kg)] with a positive test for the virus that causes COVID-19, and who are at high risk for progression to severe COVID-19, including hospitalization or death, under an EUA.   2   What should I tell my healthcare provider before I take PAXLOVID?   Tell your healthcare provider if you:    Have any allergies    Have liver or kidney disease    Are pregnant or plan to become pregnant    Are breastfeeding a child    Have any serious illnesses     Tell your healthcare provider about all the medicines you take, including prescription and over-the-counter medicines, vitamins, and herbal supplements.   Some medicines may interact with PAXLOVID and may cause serious side effects. Keep a list of your medicines to show your healthcare provider and pharmacist when you get a new medicine.   You can ask your healthcare provider or pharmacist for a list of medicines that interact with PAXLOVID. Do not start taking a new medicine without telling your healthcare provider. Your healthcare provider can tell you if it is safe to take PAXLOVID with other medicines.   Tell your healthcare provider if you are taking combined hormonal contraceptive. PAXLOVID may affect how your birth control pills work. Females who are able to become pregnant should use another effective alternative form of contraception or an additional barrier method of contraception. Talk to your healthcare provider if you  have any questions about contraceptive methods that might be right for you.   How do I take PAXLOVID?    PAXLOVID consists of 2 medicines: nirmatrelvir and ritonavir. o Take 2 pink tablets of nirmatrelvir with 1 white tablet of ritonavir by mouth 2 times each day (in the morning and in the evening) for 5 days. For each dose, take all 3 tablets at the same time.   o If you have kidney disease, talk to your healthcare provider. You may need a different dose.      Swallow the tablets whole. Do not chew, break, or crush the tablets.    Take PAXLOVID with or without food.    Do not stop taking PAXLOVID without talking to your healthcare provider, even if you feel better.    If you miss a dose of PAXLOVID within 8 hours of the time it is usually taken, take it as soon as you remember. If you miss a dose by more than 8 hours, skip the missed dose and take the next dose at your regular time. Do not take 2 doses of PAXLOVID at the same time.    If you take too much PAXLOVID, call your healthcare provider or go to the nearest hospital emergency room right away.    If you are taking a ritonavir- or cobicistat-containing medicine to treat hepatitis C or Human Immunodeficiency Virus (HIV), you should continue to take your medicine as prescribed by your healthcare provider.     3   Talk to your healthcare provider if you do not feel better or if you feel worse after 5 days.   Who should generally not take PAXLOVID?   Do not take PAXLOVID if:    You are allergic to nirmatrelvir, ritonavir, or any of the ingredients in PAXLOVID.    You are taking any of the following medicines:   o Alfuzosin   o Pethidine, piroxicam, propoxyphene   o Ranolazine   o Amiodarone, dronedarone, flecainide, propafenone, quinidine   o Colchicine   o Lurasidone, pimozide, clozapine   o Dihydroergotamine, ergotamine, methylergonovine   o Lovastatin, simvastatin   o Sildenafil (Revatio®) for pulmonary arterial hypertension (PAH)   o Triazolam, oral midazolam    o Apalutamide   o Carbamazepine, phenobarbital, phenytoin   o Rifampin   o [place]'s Wort (hypericum perforatum)     Taking PAXLOVID with these medicines may cause serious or life-threatening side effects or affect how PAXLOVID works.   These are not the only medicines that may cause serious side effects if taken with PAXLOVID. PAXLOVID may increase or decrease the levels of multiple other medicines. It is very important to tell your healthcare provider about all of the medicines you are taking because additional laboratory tests or changes in the dose of your other medicines may be necessary while you are taking PAXLOVID. Your healthcare provider may also tell you about specific symptoms to watch out for that may indicate that you need to stop or decrease the dose of some of your other medicines.   What are the important possible side effects of PAXLOVID?   Possible side effects of PAXLOVID are:    Liver Problems. Tell your healthcare provider right away if you have any of these signs and symptoms of liver problems: loss of appetite, yellowing of your skin and the whites of eyes (jaundice), dark-colored urine, pale colored stools and itchy skin, stomach area (abdominal) pain.    Resistance to HIV Medicines. If you have untreated HIV infection, PAXLOVID may lead to some HIV medicines not working as well in the future.     4    Other possible side effects include:   o altered sense of taste   o diarrhea   o high blood pressure   o muscle aches     These are not all the possible side effects of PAXLOVID. Not many people have taken PAXLOVID. Serious and unexpected side effects may happen. PAXLOVID is still being studied, so it is possible that all of the risks are not known at this time.   What other treatment choices are there?   Like PAXLOVID, FDA may allow for the emergency use of other medicines to treat people with COVID-19. Go to  https://www.fda.gov/emergency-preparedness-and-response/mcm-legal-regulatory-and-policy-framework/emergency-use-authorization for information on the emergency use of other medicines that are authorized by FDA to treat people with COVID-19. Your healthcare provider may talk with you about clinical trials for which you may be eligible.   It is your choice to be treated or not to be treated with PAXLOVID. Should you decide not to receive it or for your child not to receive it, it will not change your standard medical care.   What if I am pregnant or breastfeeding?   There is no experience treating pregnant women or breastfeeding mothers with PAXLOVID. For a mother and unborn baby, the benefit of taking PAXLOVID may be greater than the risk from the treatment. If you are pregnant, discuss your options and specific situation with your healthcare provider.   It is recommended that you use effective barrier contraception or do not have sexual activity while taking PAXLOVID.   If you are breastfeeding, discuss your options and specific situation with your healthcare provider.   How do I report side effects with PAXLOVID?   Contact your healthcare provider if you have any side effects that bother you or do not go away.   Report side effects to FDA MedWatch at www.fda.gov/medwatch or call 3-073-ABQ-7408 or you can report side effects to [employer] at the contact information provided below.   Website  Fax number  Telephone number    [employer]  [phone]  [phone]         Fact Sheet for Patients And Caregivers   Emergency Use Authorization (EUA) Of Molnupiravir For Coronavirus Disease 2019 (COVID-19)   What is the most important information I should know about molnupiravir?   Molnupiravir may cause serious side effects, including:    Molnupiravir may cause harm to your unborn baby. It is not known if molnupiravir will harm your baby if you take molnupiravir during pregnancy.   o Molnupiravir is  not recommended for use in pregnancy.   o Molnupiravir has not been studied in pregnancy. Molnupiravir was studied in pregnant animals only. When molnupiravir was given to pregnant animals, molnupiravir caused harm to their unborn babies.   o You and your healthcare provider may decide that you should take molnupiravir during pregnancy if there are no other COVID-19 treatment options authorized by the FDA that are accessible or clinically appropriate for you.   o If you and your healthcare provider decide that you should take molnupiravir during pregnancy, you and your healthcare provider should discuss the known and potential benefits and the potential risks of taking molnupiravir during pregnancy.     For individuals who are able to become pregnant:    You should use a reliable method of birth control (contraception) consistently and correctly during treatment with molnupiravir and for 4 days after the last dose of molnupiravir. Talk to your healthcare provider about reliable birth control methods.    Before starting treatment with molnupiravir your healthcare provider may do a pregnancy test to see if you are pregnant before starting treatment with molnupiravir.    Tell your healthcare provider right away if you become pregnant or think you may be pregnant during treatment with molnupiravir.     Pregnancy Surveillance Program:    There is a pregnancy surveillance program for individuals who take molnupiravir during pregnancy. The purpose of this program is to collect information about the health of you and your baby. Talk to your healthcare provider about how to take part in this program.    If you take molnupiravir during pregnancy and you agree to participate in the pregnancy surveillance program and allow your healthcare provider to share your information with [employer], then your healthcare provider will report your use of molnupiravir during pregnancy to [employer]. by calling  "[phone] or [email or web address].     For individuals who are sexually active with partners who are able to become pregnant:    It is not known if molnupiravir can affect sperm. While the risk is regarded as low, animal studies to fully assess the potential for molnupiravir to affect the babies of males treated with molnupiravir have not been completed. A reliable method of birth control (contraception) should be used consistently and correctly during treatment with molnupiravir and for at least 3 months after the last dose. The risk to sperm beyond 3 months is not known. Studies to understand the risk to sperm beyond 3 months are ongoing. Talk to your healthcare provider       about reliable birth control methods. Talk to your healthcare provider if you have questions or concerns about how molnupiravir may affect sperm.     You are being given this fact sheet because your healthcare provider believes it is necessary to provide you with molnupiravir for the treatment of adults with mild-to-moderate coronavirus disease 2019 (COVID-19) with positive results of direct SARS-CoV-2 viral testing, and   who are at high risk for progressing to severe COVID-19 including hospitalization or death, and   for whom other COVID-19 treatment options authorized by the FDA are not accessible or clinically appropriate.   The U.S. Food and Drug Administration (FDA) has issued an Emergency Use Authorization (EUA) to make molnupiravir available during the COVID-19 pandemic (for more details about an EUA please see \"What is an Emergency Use Authorization?\" at the end of this document). Molnupiravir is not an FDA-approved medicine in the [place]. Read this Fact Sheet for information about molnupiravir. Talk to your healthcare provider about your options if you have any questions. It is your choice to take molnupiravir.   What is COVID-19?   COVID-19 is caused by a virus called a coronavirus. You can get COVID-19 through " "close contact with another person who has the virus.   COVID-19 illnesses have ranged from very mild-to-severe, including illness resulting in death. While information so far suggests that most COVID-19 illness is mild, serious illness can happen and may cause some of your other medical conditions to become worse. Older people and people of all ages with severe, long lasting (chronic) medical conditions like heart disease, lung disease and diabetes, for example seem to be at higher risk of being hospitalized for COVID-19.   What is molnupiravir?   Molnupiravir is an investigational medicine used to treat mild-to-moderate COVID-19 in adults:    with positive results of direct SARS-CoV-2 viral testing, and    who are at high risk for progressing to severe COVID-19 including hospitalization or death, and for whom other COVID-19 treatment options authorized by the FDA are not accessible or clinically appropriate.     The FDA has authorized the emergency use of molnupiravir for the treatment of mild-to-moderate COVID-19 in adults under an EUA. For more information on EUA, see the \"What is an Emergency Use Authorization (EUA)?\" section at the end of this Fact Sheet.   Molnupiravir is not authorized:    for use in people less than 18 years of age.    for prevention of COVID-19.    for people needing hospitalization for COVID-19.    for use for longer than 5 consecutive days.     What should I tell my healthcare provider before I take molnupiravir?   Tell your healthcare provider if you:    Have any allergies    Are breastfeeding or plan to breastfeed    Have any serious illnesses    Are taking any medicines (prescription, over-the-counter, vitamins, or herbal products).     How do I take molnupiravir?    Take molnupiravir exactly as your healthcare provider tells you to take it.    Take 4 capsules of molnupiravir every 12 hours (for example, at 8 am and at 8 pm)    Take molnupiravir for 5 days. It is important that you " "complete the full 5 days of treatment with molnupiravir. Do not stop taking molnupiravir before you complete the full 5 days of treatment, even if you feel better.    Take molnupiravir with or without food.    You should stay in isolation for as long as your healthcare provider tells you to. Talk to your healthcare provider if you are not sure about how to properly isolate while you have COVID-19.    Swallow molnupiravir capsules whole. Do not open, break, or crush the capsules. If you cannot swallow capsules whole, tell your healthcare provider.    What to do if you miss a dose: o If it has been less than 10 hours since the missed dose, take it as soon as you remember   o If it has been more than 10 hours since the missed dose, skip the missed dose and take your dose at the next scheduled time.      Do not double the dose of molnupiravir to make up for a missed dose.     What are the important possible side effects of molnupiravir?   Possible side effects of molnupiravir are:    See, \"What is the most important information I should know about molnupiravir?\"    diarrhea    nausea    dizziness     These are not all the possible side effects of molnupiravir. Not many people have taken molnupiravir. Serious and unexpected side effects may happen. This medicine is still being studied, so it is possible that all of the risks are not known at this time.   What other treatment choices are there?   Like molnupiravir, FDA may allow for the emergency use of other medicines to treat people with COVID-19. Go to https://www.fda.gov/emergency-preparedness-and-response/mcm-legal-regulatory-and-policy-framework/emergency-use-authorization for more information.   It is your choice to be treated or not to be treated with molnupiravir. Should you decide not to take it, it will not change your standard medical care.   What if I am breastfeeding?   Breastfeeding is not recommended during treatment with molnupiravir and for 4 days after " the last dose of molnupiravir. If you are breastfeeding or plan to breastfeed, talk to your healthcare provider about your options and specific situation before taking molnupiravir.   How do I report side effects with molnupiravir?   Contact your healthcare provider if you have any side effects that bother you or do not go away.   Report side effects to FDA MedWatch at www.fda.gov/medwatch or call 8-592-OPB-3132 (1- [phone]).   How should I store molnupiravir?    Store molnupiravir capsules at room temperature between 68°F to 77°F (20°C to 25°C).    Keep molnupiravir and all medicines out of the reach of children and pets.     How can I learn more about COVID-19?    Ask your healthcare provider.    Visit www.cdc.gov/COVID19    Contact your local or state public health department.    Call [employer] at [phone] (toll free in the [place]    Visit www.[employer]     What Is an Emergency Use Authorization (EUA)?   The [place] FDA has made molnupiravir available under an emergency access mechanism called an Emergency Use Authorization (EUA) The EUA is supported by a [place] of Health and Human Service (HHS) declaration that circumstances exist to justify emergency use of drugs and biological products during the COVID-19 pandemic.   Molnupiravir for the treatment of mild-to-moderate COVID-19 in adults with positive results of direct SARS-CoV-2 viral testing, who are at high risk for progression to severe COVID-19, including hospitalization or death, and for whom alternative COVID-19 treatment options authorized by FDA are not accessible or clinically appropriate, has not undergone the same type of review as an FDA-approved product. In issuing an EUA under the COVID-19 public health emergency, the FDA has determined, among other things, that based on the total amount of scientific evidence available including data from adequate and well-controlled clinical trials, if available, it is  reasonable to believe that the product may be effective for diagnosing, treating, or preventing COVID-19, or a serious or life-threatening disease or condition caused by COVID- 19; that the known and potential benefits of the product, when used to diagnose, treat, or prevent such disease or condition, outweigh the known and potential risks of such product; and that there are no adequate, approved, and available alternatives.   All of these criteria must be met to allow for the product to be used in the treatment of patients during the COVID-19 pandemic. The EUA for molnupiravir is in effect for the duration of the COVID-19 declaration justifying emergency use of molnupiravir, unless terminated or revoked (after which molnupiravir may no longer be used under the EUA).   For patent information: [email or web address]/research/[email or web address]   Copyright © 2021 [employer] [place], [place] and its affiliates.   All rights reserved.   [id number]   Issued: 12/23/2021

## 2024-01-30 ENCOUNTER — APPOINTMENT (OUTPATIENT)
Dept: RADIOLOGY | Facility: MEDICAL CENTER | Age: 69
End: 2024-01-30
Payer: COMMERCIAL

## 2024-01-30 DIAGNOSIS — M25.552 PAIN OF LEFT HIP: ICD-10-CM

## 2024-01-30 PROCEDURE — 73502 X-RAY EXAM HIP UNI 2-3 VIEWS: CPT

## 2024-02-01 NOTE — PROGRESS NOTES
1. Pain of left hip  -     XR hip/pelv 2-3 vws left if performed  -     Ambulatory Referral to Physical Therapy; Future  -     Ambulatory Referral to Orthopedic Surgery; Future

## 2024-02-07 ENCOUNTER — VBI (OUTPATIENT)
Dept: ADMINISTRATIVE | Facility: OTHER | Age: 69
End: 2024-02-07

## 2024-02-08 ENCOUNTER — OFFICE VISIT (OUTPATIENT)
Dept: OBGYN CLINIC | Facility: MEDICAL CENTER | Age: 69
End: 2024-02-08
Payer: COMMERCIAL

## 2024-02-08 ENCOUNTER — APPOINTMENT (OUTPATIENT)
Dept: RADIOLOGY | Facility: MEDICAL CENTER | Age: 69
End: 2024-02-08
Payer: COMMERCIAL

## 2024-02-08 VITALS
HEIGHT: 61 IN | SYSTOLIC BLOOD PRESSURE: 168 MMHG | HEART RATE: 89 BPM | DIASTOLIC BLOOD PRESSURE: 77 MMHG | BODY MASS INDEX: 22.84 KG/M2 | WEIGHT: 121 LBS

## 2024-02-08 DIAGNOSIS — M25.552 PAIN OF LEFT HIP: ICD-10-CM

## 2024-02-08 DIAGNOSIS — M47.816 LUMBAR SPONDYLOSIS: Primary | ICD-10-CM

## 2024-02-08 DIAGNOSIS — M54.16 RADICULOPATHY, LUMBAR REGION: ICD-10-CM

## 2024-02-08 PROCEDURE — 99204 OFFICE O/P NEW MOD 45 MIN: CPT | Performed by: STUDENT IN AN ORGANIZED HEALTH CARE EDUCATION/TRAINING PROGRAM

## 2024-02-08 PROCEDURE — 72110 X-RAY EXAM L-2 SPINE 4/>VWS: CPT

## 2024-02-08 NOTE — PROGRESS NOTES
Orthopedic Surgery Office Note  Helena Rueda (69 y.o. female)   : 1955   MRN: [id number]   Encounter Date: 2024 with Dr. Anthony Munoz,   Chief Complaint   Patient presents with    Left Hip - Pain       Assessment, Plan, & Discussion:     Left lumbar radiculopathy, lumbar degenerative spondylosis   - we discussed in detail her history, physical examination and x-ray images of her left hip and lumbar spine.  Her symptoms are most likely related all coming from her lumbar spine as she has significant degenerative disc disease with osteophyte formation and facet arthrosis.  Her current pain is all localized to the left posterolateral buttock radiating down the lateral aspect of the thigh and wrapping around the medial aspect of the knee correlating with radiculopathy.  We discussed conservative treatment for her condition in the form of starting physical therapy to work on lumbar strengthening and stretching as well as hamstring stretching, core muscle strengthening and overall posture.  We also discussed the role of anti-inflammatory over-the-counter medications such as NSAIDs and over-the-counter Tylenol to use as needed.  We discussed that after a trial of physical therapy, if she feels that she does not get significant relief the next step will be placing a referral to spine and pain management and obtaining an MRI of the lumbar spine to better assess the neural elements.  Left hip osteoarthritis  - We discussed that she has very mild osteoarthritis in the left hip joint, we discussed how these symptoms present and her current symptomatology is do not correlated with hip arthritic pain.    Plan:   As needed    Surgery:   No surgery planned at this time      Orders:     Diagnoses and all orders for this visit:    Lumbar spondylosis  -     Ambulatory Referral to Physical Therapy; Future    Radiculopathy, lumbar region  -     Ambulatory Referral to Physical Therapy; Future    History of Present  "Illness:     Helena Rueda is a 69 y.o. female who presents for consultation at the request of Jonatan Newsome MD regarding left posterior lateral hip pain.  Pain has been present for a couple months without any inciting events, denies any falls or injuries.  She localizes the pain to the posterior lateral buttock with radiation down the lateral thigh and wrapping around the medial aspect of the knee.  Pain is worse when laying down.  She denies weightbearing pain.  She denies pain in her groin or anterior thigh.  She has not attempted physical therapy or any other treatments besides as needed anti-inflammatory medications.    Review of Systems  Constitutional: Negative for fatigue, fever or loss of appetite.   HENT: Negative.    Respiratory: Negative for shortness of breath, dyspnea.    Cardiovascular: Negative for chest pain/tightness.   Gastrointestinal: Negative for abdominal pain, N/V.   Endocrine: Negative for cold/heat intolerance, unexplained weight loss/gain.   Genitourinary: Negative for flank pain, dysuria  Skin: Negative for rash.    Psychiatric/Behavioral: Negative for agitation.  All else negative unless otherwise noted in HPI    Physical Exam:   General:  /77   Pulse 89   Ht 5' 1\" (1.549 m)   Wt 54.9 kg (121 lb)   BMI 22.86 kg/m²   Cons: Appears well.  No apparent distress.  Psych: Alert. Oriented x3.  Mood and affect normal.  Eyes: PERRLA, EOMI  Resp: Normal effort.  No audible wheezing or stridor.  CV: Extremities warm and well perfused.   Abd:    No distention or guarding.   Skin: Warm. No visible lesions.  Neuro: Normal muscle tone.      Orthopedic Exam:    Lumbar spine/Hip left:   there is loss of lumbar lordosis, there is tenderness to palpation along the left posterolateral buttock and mildly tender along the left greater trochanteric flare.  There are no skin lesions, no wounds.  There is no pain with passive or active internal/external rotation of the hip.  She does have " decreased range of motion with flexion and extension of the lumbar spine.  No leland weakness, 5 out of 5 strength to hip flexion, hip extension, knee extension and flexion, ankle plantarflexion dorsiflexion.  Sensation grossly intact to light touch to all distributions distally including the sural, saphenous, deep peroneal, superficial peroneal and tibial nerve distributions.      Imaging/Studies:     Study: Left hip xrays  Date: 1/30/2024  Report: I have read and agree with the radiologist report.  I have personally reviewed imaging and my impression is as follows: Very mild left hip degenerative joint disease as evidenced by joint space narrowing and subchondral sclerosis, lumbar spine degenerative disease noted    Study: Lumbar Spine xrays  Date: 2/8/2024  Report: No radiologist report was available at this time.   I have personally reviewed imaging and my impression is as follows: Severe lumbar degenerative disc disease, degenerative scoliosis of the  lumbar spine, multilevel  facet arthrosis, no leland instability on flexion and extension views    Procedures  No procedures today.      Medical, Surgical, Family, and Social History    The patient's medical history, family history, and social history, were reviewed and updated as appropriate.    Past Medical History:   Diagnosis Date    Medial epicondylitis of elbow, unspecified laterality     Last Assessed: 9/26/2014    Vertigo, benign paroxysmal, unspecified laterality     Last Assessed: 1/30/2013     Past Surgical History:   Procedure Laterality Date    ABDOMINO-VAGINAL VESICAL NECK SUSPENSION      BREAST BIOPSY Left     Benign    OOPHORECTOMY      Unilateral     Family History   Problem Relation Age of Onset    Diabetes Mother     Hypertension Mother     Hyperlipidemia Mother         Pure    Cancer Father         Gastric    Cancer Sister         unknown type    No Known Problems Sister     No Known Problems Sister     No Known Problems Sister     No Known  Problems Sister     No Known Problems Daughter     Coronary artery disease Maternal Grandmother     No Known Problems Maternal Grandfather     No Known Problems Paternal Grandmother     Coronary artery disease Paternal Grandfather      Social History     Occupational History    Not on file   Tobacco Use    Smoking status: Never    Smokeless tobacco: Never    Tobacco comments:     no secondhand smoke exposure   Vaping Use    Vaping status: Never Used   Substance and Sexual Activity    Alcohol use: Yes     Comment: social    Drug use: Never    Sexual activity: Not on file     No Known Allergies    Current Outpatient Medications:     aspirin 81 MG tablet, Take by mouth, Disp: , Rfl:     CALCIUM PO, Take by mouth, Disp: , Rfl:     MAGNESIUM PO, Take by mouth, Disp: , Rfl:     naproxen sodium (ALEVE) 220 MG tablet, Take 2 tablets (440 mg total) by mouth 2 (two) times a day with meals, Disp: , Rfl:     pantoprazole (PROTONIX) 40 mg tablet, TAKE 1 TABLET BY MOUTH EVERY DAY BEFORE BREAKFAST, Disp: 90 tablet, Rfl: 1    medroxyPROGESTERone (PROVERA) 2.5 mg tablet, Take 2.5 mg by mouth in the morning. (Patient not taking: Reported on 2/8/2024), Disp: , Rfl:       This Visit:     40 minutes was spent in the coordination of care, reviewing of imaging and with the patient on the date of service    MD Dr. Anthony Estevez DO, Orthopedic Surgeon  [facility]

## 2024-02-20 ENCOUNTER — EVALUATION (OUTPATIENT)
Dept: PHYSICAL THERAPY | Facility: MEDICAL CENTER | Age: 69
End: 2024-02-20
Payer: COMMERCIAL

## 2024-02-20 DIAGNOSIS — M54.16 RADICULOPATHY, LUMBAR REGION: ICD-10-CM

## 2024-02-20 DIAGNOSIS — M47.816 LUMBAR SPONDYLOSIS: Primary | ICD-10-CM

## 2024-02-20 PROCEDURE — 97161 PT EVAL LOW COMPLEX 20 MIN: CPT | Performed by: PHYSICAL THERAPIST

## 2024-02-21 NOTE — PROGRESS NOTES
PT Evaluation     Today's date: 2024  Patient name: Helena Rueda  : 1955  MRN: [id number]  Referring provider: Alistair Roca MD  Dx:   Encounter Diagnosis     ICD-10-CM    1. Lumbar spondylosis  M47.816 Ambulatory Referral to Physical Therapy      2. Radiculopathy, lumbar region  M54.16 Ambulatory Referral to Physical Therapy                     Assessment  Assessment details: Helena Rueda is a 69 y.o. female was evaluated on 2024  for Lumbar spondylosis  (primary encounter diagnosis)  Radiculopathy, lumbar region. Helena Rueda has the above listed impairments resulting in functional deficits and negative impact to quality of life.  Patient is appropriate for skilled PT intervention to promote maximal return to function and patient specific goals.      Patient agrees with outlined treatment plan and all questions were answered to their satisfaction.      Impairments: abnormal muscle firing, abnormal muscle tone, abnormal or restricted ROM, impaired physical strength, lacks appropriate home exercise program and pain with function  Understanding of Dx/Px/POC: good   Prognosis: good    Goals  Patient will successfully transition to home exercise program.  Patient will be able to manage symptoms independently.    Helena will report no limitation in falling asleep   Helena will report no pain first standing from chair and beginning walking     Plan  Patient would benefit from: skilled PT  Referral necessary: No  Planned modality interventions: thermotherapy: hydrocollator packs  Planned therapy interventions: home exercise program, manual therapy, neuromuscular re-education, patient education, functional ROM exercises, strengthening, stretching, joint mobilization, graded activity, graded exercise, therapeutic exercise, body mechanics training, motor coordination training and activity modification  Frequency: 1x week  Duration in weeks: 12  Treatment plan discussed with:  patient        Subjective Evaluation    History of Present Illness  Mechanism of injury: Helena Rueda is a 69 y.o. female presenting to therapy with complaints of low back and left hip pain.  Pain began a few weeks ago, unknown reason with no known injury.  She notes pain primarily bothers her when first standing up or when laying down at night and trying to sleep.  Pain begins in hip and wraps down lateral leg to knee.   She finds it difficult to find a comfortable position at night to sleep.   Radiographs of lumbar spine indicated moderate lumbar OA, mild OA of L hip.    Patient Goals  Patient goals for therapy: decreased pain, increased motion, return to sport/leisure activities and increased strength    Pain  Current pain rating: 3  At best pain ratin  At worst pain ratin  Quality: dull ache, discomfort, pressure and tight          Objective     Lumbar Screen  Lumbar range of motion within normal limits with the following exceptions:Lumbar motion with overpressure caused more low back pain on L, no reproduction of leg pain with provocative testing     Passive Range of Motion   Left Hip   Flexion: with pain  Extension: 10 degrees   External rotation (90/90): 25 degrees with pain  Internal rotation (90/90): 25 degrees     Right Hip   Extension: 20 degrees   External rotation (90/90): 40 degrees   Internal rotation (90/90): 25 degrees     Additional Passive Range of Motion Details  Comparable sign with hip ER     Joint Play   Left Hip   Hypomobile in the posterior hip capsule and anterior hip capsule.    Strength/Myotome Testing     Left Hip   Planes of Motion   Flexion: 4+  Extension: 4+  Abduction: 4-  Adduction: 4+  External rotation: 4+    Right Hip   Planes of Motion   Flexion: 4+  Extension: 4+  Abduction: 4+  Adduction: 4+  External rotation: 4+    Tests     Left Hip   Positive EDISON.   Negative FADIRMissy's, Gillet's, SI compression and SI distraction.     Right Hip   Negative ROC HOGAN  and christiano.              Precautions: None      Manuals 2/20            Hip lateral distraction AF                                                    Neuro Re-Ed                                                                                                        Ther Ex             Hip ER stretch 30 sec X 3            Open book with hip stabilization 5 sec  X 10                                                                                           Ther Activity                                       Gait Training                                       Modalities

## 2024-02-27 ENCOUNTER — APPOINTMENT (OUTPATIENT)
Dept: PHYSICAL THERAPY | Facility: MEDICAL CENTER | Age: 69
End: 2024-02-27
Payer: COMMERCIAL

## 2024-03-01 ENCOUNTER — OFFICE VISIT (OUTPATIENT)
Dept: PHYSICAL THERAPY | Facility: MEDICAL CENTER | Age: 69
End: 2024-03-01
Payer: COMMERCIAL

## 2024-03-01 DIAGNOSIS — M54.16 RADICULOPATHY, LUMBAR REGION: ICD-10-CM

## 2024-03-01 DIAGNOSIS — M47.816 LUMBAR SPONDYLOSIS: Primary | ICD-10-CM

## 2024-03-01 PROCEDURE — 97110 THERAPEUTIC EXERCISES: CPT | Performed by: PHYSICAL THERAPIST

## 2024-03-01 NOTE — PROGRESS NOTES
Daily Note     Today's date: 3/1/2024  Patient name: Helena Rueda  : 1955  MRN: [id number]  Referring provider: Alistair Roca MD  Dx:   Encounter Diagnosis     ICD-10-CM    1. Lumbar spondylosis  M47.816       2. Radiculopathy, lumbar region  M54.16                      Subjective: Helena reports that she is still having same pain laying down at night       Objective: See treatment diary below      Assessment: Tolerated treatment well. Patient demonstrated fatigue post treatment and exhibited good technique with therapeutic exercises  Focus more on lumbar opening today with good relief of leg pain.         Plan: Continue per plan of care.      Precautions: None      Manuals 3/1                                                                Neuro Re-Ed                                                                                                        Ther Ex             LTR 30            SKC 10 sec  X 10            Nerve glide 20                                                                             Ther Activity                                       Gait Training                                       Modalities             Traction 50#  10 min

## 2024-03-19 ENCOUNTER — OFFICE VISIT (OUTPATIENT)
Dept: FAMILY MEDICINE CLINIC | Facility: CLINIC | Age: 69
End: 2024-03-19
Payer: MEDICARE

## 2024-03-19 VITALS
SYSTOLIC BLOOD PRESSURE: 118 MMHG | DIASTOLIC BLOOD PRESSURE: 68 MMHG | HEIGHT: 61 IN | BODY MASS INDEX: 23.03 KG/M2 | WEIGHT: 122 LBS | RESPIRATION RATE: 16 BRPM

## 2024-03-19 DIAGNOSIS — M25.552 PAIN OF LEFT HIP: Primary | ICD-10-CM

## 2024-03-19 DIAGNOSIS — M51.9 LUMBAR DISC DISEASE: ICD-10-CM

## 2024-03-19 DIAGNOSIS — K21.9 GASTROESOPHAGEAL REFLUX DISEASE, UNSPECIFIED WHETHER ESOPHAGITIS PRESENT: ICD-10-CM

## 2024-03-19 PROCEDURE — 99214 OFFICE O/P EST MOD 30 MIN: CPT | Performed by: FAMILY MEDICINE

## 2024-03-19 PROCEDURE — G2211 COMPLEX E/M VISIT ADD ON: HCPCS | Performed by: FAMILY MEDICINE

## 2024-03-19 RX ORDER — MELOXICAM 15 MG/1
15 TABLET ORAL DAILY
Qty: 30 TABLET | Refills: 0 | Status: SHIPPED | OUTPATIENT
Start: 2024-03-19

## 2024-03-19 NOTE — PATIENT INSTRUCTIONS
1. Pain of left hip  Assessment & Plan:  Patient does have hip pain, however this appears to be related to lumbar issues based on the x-rays that she had done from orthopedics recently, as well as their visit.      Orders:  -     meloxicam (Mobic) 15 mg tablet; Take 1 tablet (15 mg total) by mouth daily    2. Lumbar disc disease  Assessment & Plan:  Significant findings on her recent x-ray.  She did do physical therapy as directed by orthopedics for this.  She has gotten worse since this, not better.  Given it has been a while now anyway, I would agree that an MRI would be reasonable of the lumbar spine, and then consider follow-up with pain management based on that.  Reviewed the images with the patient today.  She understood.    Discussed about using anti-inflammatories.  Preferred Celebrex secondary to history of reflux, however it is not covered under her insurance.  Trial of meloxicam 15 mg daily.  This would replace any others that she would use over-the-counter, such as ibuprofen, Aleve.  Could use Tylenol along with it.    Orders:  -     MRI lumbar spine wo contrast; Future; Expected date: 03/19/2024  -     meloxicam (Mobic) 15 mg tablet; Take 1 tablet (15 mg total) by mouth daily    3. Gastroesophageal reflux disease, unspecified whether esophagitis present  Assessment & Plan:  Continues on pantoprazole.  No change.          COVID 19 Instructions    Helena Rueda was advised to limit contact with others to essential tasks such as getting food, medications, and medical care.    Proper handwashing reviewed, and Hand sanitzer when washing is not available.    If the patient develops symptoms of COVID 19, the patient should call the office as soon as possible.    It is strongly recommended that Flu Vaccinations be obtained.      Virtual Visits:  Consuelo: This works on smart phones (any phone with Internet browsing capability).  You should get a text message when the provider is ready to see you.  Click on the  link in the text message, and the call should start.  You will need to type in your name, and allow camera and microphone access.  This is HIPPA compliant, and secure.      If you have not already done so, get immunized to COVID 19.      We are committed to getting you vaccinated as soon as possible and will be closely following CDC and [facility] guidelines as they are released and revised.  Please refer to our COVID-19 vaccine webpage for the most up to date information on the vaccine and our distribution efforts.    This site will also have the most up to date recommendations for COVID booster vaccine.    [email or web address]    Call [phone] ([phone]), option 7    You can also visit https://www.vaccines.gov/ to find vaccines in your area.    OUR LOCATION:    [facility]  [address] [place] [address], [address]  [place], [place], [address] [phone]  Fax: [phone]    Lab services, Rheumatology, and OB/GYN are at this location as well.

## 2024-03-19 NOTE — ASSESSMENT & PLAN NOTE
Patient does have hip pain, however this appears to be related to lumbar issues based on the x-rays that she had done from orthopedics recently, as well as their visit.

## 2024-03-19 NOTE — PROGRESS NOTES
Name: Helena Rueda      : 1955      MRN: [id number]  Encounter Provider: Jonatan Newsome MD  Encounter Date: 3/19/2024   Encounter department: [facility]    Assessment & Plan     1. Pain of left hip  Assessment & Plan:  Patient does have hip pain, however this appears to be related to lumbar issues based on the x-rays that she had done from orthopedics recently, as well as their visit.      Orders:  -     meloxicam (Mobic) 15 mg tablet; Take 1 tablet (15 mg total) by mouth daily    2. Lumbar disc disease  Assessment & Plan:  Significant findings on her recent x-ray.  She did do physical therapy as directed by orthopedics for this.  She has gotten worse since this, not better.  Given it has been a while now anyway, I would agree that an MRI would be reasonable of the lumbar spine, and then consider follow-up with pain management based on that.  Reviewed the images with the patient today.  She understood.    Discussed about using anti-inflammatories.  Preferred Celebrex secondary to history of reflux, however it is not covered under her insurance.  Trial of meloxicam 15 mg daily.  This would replace any others that she would use over-the-counter, such as ibuprofen, Aleve.  Could use Tylenol along with it.    Orders:  -     MRI lumbar spine wo contrast; Future; Expected date: 2024  -     meloxicam (Mobic) 15 mg tablet; Take 1 tablet (15 mg total) by mouth daily    3. Gastroesophageal reflux disease, unspecified whether esophagitis present  Assessment & Plan:  Continues on pantoprazole.  No change.             Subjective      Chief Complaint   Patient presents with   • Hip Pain       Patient is here to follow-up on her leg pain.  She did have an x-ray previously.  There was not any arthritis specifically on that with the knee or hip joints.  Unfortunately, certain things bother her still with this.  It is not all the time.  Seems to be getting worse at this point.  There is  "more problems at night as well.  Pain seem to come from just above the hip down into the thigh, and then just above the knee down into the calf.    Patient did go to orthopedics.  They did x-rays of the lumbar spine which did show some changes.        Review of Systems    Current Outpatient Medications on File Prior to Visit   Medication Sig   • aspirin 81 MG tablet Take by mouth   • CALCIUM PO Take by mouth   • MAGNESIUM PO Take by mouth   • pantoprazole (PROTONIX) 40 mg tablet TAKE 1 TABLET BY MOUTH EVERY DAY BEFORE BREAKFAST   • [DISCONTINUED] naproxen sodium (ALEVE) 220 MG tablet Take 2 tablets (440 mg total) by mouth 2 (two) times a day with meals   • medroxyPROGESTERone (PROVERA) 2.5 mg tablet Take 2.5 mg by mouth in the morning. (Patient not taking: Reported on 2/8/2024)       Objective     /68 (BP Location: Left arm, Patient Position: Sitting, Cuff Size: Standard)   Resp 16   Ht 5' 1\" (1.549 m)   Wt 55.3 kg (122 lb)   BMI 23.05 kg/m²     Physical Exam  Jonatan Newsome MD    "

## 2024-03-19 NOTE — ASSESSMENT & PLAN NOTE
Significant findings on her recent x-ray.  She did do physical therapy as directed by orthopedics for this.  She has gotten worse since this, not better.  Given it has been a while now anyway, I would agree that an MRI would be reasonable of the lumbar spine, and then consider follow-up with pain management based on that.  Reviewed the images with the patient today.  She understood.    Discussed about using anti-inflammatories.  Preferred Celebrex secondary to history of reflux, however it is not covered under her insurance.  Trial of meloxicam 15 mg daily.  This would replace any others that she would use over-the-counter, such as ibuprofen, Aleve.  Could use Tylenol along with it.

## 2024-03-20 ENCOUNTER — TELEPHONE (OUTPATIENT)
Age: 69
End: 2024-03-20

## 2024-03-20 NOTE — TELEPHONE ENCOUNTER
Nelli from [facility] called stating they need prior auth completed for patients MRI on 03/29 .      [facility]  (P) [phone]  (F) [phone]  NPI: [id number]

## 2024-03-20 NOTE — TELEPHONE ENCOUNTER
Pt called stating she needs an open MRI and [facility] does not have one.      Gave pt [facility] number. [phone]

## 2024-04-18 ENCOUNTER — RA CDI HCC (OUTPATIENT)
Dept: OTHER | Facility: HOSPITAL | Age: 69
End: 2024-04-18

## 2024-04-24 ENCOUNTER — TELEPHONE (OUTPATIENT)
Dept: ADMINISTRATIVE | Facility: OTHER | Age: 69
End: 2024-04-24

## 2024-04-24 NOTE — TELEPHONE ENCOUNTER
04/24/24 2:59 PM    Patient contacted (spoke with patient) to bring Advance Directive, POLST, or Living Will document to next scheduled pcp visit.    Thank you.  Danielle Abdalla [facility]

## 2024-04-25 ENCOUNTER — OFFICE VISIT (OUTPATIENT)
Dept: FAMILY MEDICINE CLINIC | Facility: CLINIC | Age: 69
End: 2024-04-25
Payer: COMMERCIAL

## 2024-04-25 VITALS
WEIGHT: 122 LBS | HEIGHT: 61 IN | DIASTOLIC BLOOD PRESSURE: 68 MMHG | HEART RATE: 87 BPM | BODY MASS INDEX: 23.03 KG/M2 | OXYGEN SATURATION: 98 % | SYSTOLIC BLOOD PRESSURE: 126 MMHG

## 2024-04-25 DIAGNOSIS — Z29.11 NEED FOR RSV VACCINATION: ICD-10-CM

## 2024-04-25 DIAGNOSIS — Z12.31 BREAST CANCER SCREENING BY MAMMOGRAM: ICD-10-CM

## 2024-04-25 DIAGNOSIS — Z12.11 COLON CANCER SCREENING: ICD-10-CM

## 2024-04-25 DIAGNOSIS — M25.552 PAIN OF LEFT HIP: ICD-10-CM

## 2024-04-25 DIAGNOSIS — E78.2 MIXED HYPERLIPIDEMIA: Primary | ICD-10-CM

## 2024-04-25 DIAGNOSIS — Z23 NEED FOR INFLUENZA VACCINATION: ICD-10-CM

## 2024-04-25 DIAGNOSIS — H00.011 HORDEOLUM EXTERNUM OF RIGHT UPPER EYELID: ICD-10-CM

## 2024-04-25 DIAGNOSIS — Z78.0 POST-MENOPAUSE: ICD-10-CM

## 2024-04-25 DIAGNOSIS — M51.9 LUMBAR DISC DISEASE: ICD-10-CM

## 2024-04-25 DIAGNOSIS — Z23 NEED FOR ZOSTER VACCINE: ICD-10-CM

## 2024-04-25 DIAGNOSIS — Z12.11 ENCOUNTER FOR SCREENING COLONOSCOPY: ICD-10-CM

## 2024-04-25 DIAGNOSIS — Z23 NEED FOR VACCINATION FOR STREP PNEUMONIAE: ICD-10-CM

## 2024-04-25 DIAGNOSIS — Z00.00 ENCOUNTER FOR ANNUAL WELLNESS VISIT (AWV) IN MEDICARE PATIENT: ICD-10-CM

## 2024-04-25 DIAGNOSIS — K21.9 GASTROESOPHAGEAL REFLUX DISEASE, UNSPECIFIED WHETHER ESOPHAGITIS PRESENT: ICD-10-CM

## 2024-04-25 DIAGNOSIS — Z23 NEED FOR COVID-19 VACCINE: ICD-10-CM

## 2024-04-25 PROBLEM — U07.1 COVID-19 VIRUS INFECTION: Status: RESOLVED | Noted: 2024-01-10 | Resolved: 2024-04-25

## 2024-04-25 PROCEDURE — G0439 PPPS, SUBSEQ VISIT: HCPCS | Performed by: FAMILY MEDICINE

## 2024-04-25 PROCEDURE — 99214 OFFICE O/P EST MOD 30 MIN: CPT | Performed by: FAMILY MEDICINE

## 2024-04-25 NOTE — ASSESSMENT & PLAN NOTE
Patient reports sweep and acid causes more problems.  Does not take pantoprazole on a daily basis.  Usually will have some symptoms once a week.  Would recommend that she try medication, but could change to famotidine 20 mg from the pantoprazole if she wishes.  She is using omeprazole OTC when she has symptoms, and that seems to resolve it.  Could also take a Tums when she is having problems along with the omeprazole.

## 2024-04-25 NOTE — PATIENT INSTRUCTIONS
1. Mixed hyperlipidemia  Assessment & Plan:  Patient has had significant elevations in cholesterol before.  This has been several years.  Would recommend checking lipid panel, CMP.  Follow-up afterwards.    Orders:  -     Comprehensive metabolic panel; Future; Expected date: 04/25/2024  -     Lipid Panel with Direct LDL reflex; Future; Expected date: 04/25/2024  -     Comprehensive metabolic panel  -     Lipid Panel with Direct LDL reflex    2. Pain of left hip  Assessment & Plan:  Patient reports that the pain that she had is now much better, to the point where it is gone.  She did not get the MRI previously.  Again, her pain is gone so she did not feel it appropriate.  I would agree.      3. Lumbar disc disease  Assessment & Plan:  Patient reports that the pain that she had is now much better, to the point where it is gone.  She did not get the MRI previously.  Again, her pain is gone so she did not feel it appropriate.  I would agree.      4. Gastroesophageal reflux disease, unspecified whether esophagitis present  Assessment & Plan:  Patient reports sweep and acid causes more problems.  Does not take pantoprazole on a daily basis.  Usually will have some symptoms once a week.  Would recommend that she try medication, but could change to famotidine 20 mg from the pantoprazole if she wishes.  She is using omeprazole OTC when she has symptoms, and that seems to resolve it.  Could also take a Tums when she is having problems along with the omeprazole.    Orders:  -     CBC and differential; Future  -     CBC and differential    5. Encounter for annual wellness visit (AWV) in Medicare patient  Comments:  Reviewed health maintenance advanced directives, immunizations.    6. Need for RSV vaccination  Comments:  Recommend consider RSV vaccination.  Information given.    7. Need for COVID-19 vaccine  Comments:  Recommend updated 2023 COVID booster.    8. Need for vaccination for Strep pneumoniae  Comments:  Recommend  Prevnar 20.    9. Need for influenza vaccination  Comments:  Recommend yearly flu vaccine    10. Need for zoster vaccine  Comments:  Recommend Shingrix.  Would be a series of 2.  This is for shingles.    11. Breast cancer screening by mammogram  Comments:  Recommend mammogram.  This to be screening for breast cancer.  Orders:  -     Mammo screening bilateral w 3d & cad; Future    12. Encounter for screening colonoscopy  Comments:  Declined colonoscopy at this point.    13. Colon cancer screening  Comments:  Patient opts for Cologuard.  She does understand the colonoscopy may be needed if Cologuard positive.  Orders:  -     Cologuard    14. Hordeolum externum of right upper eyelid  Comments:  Recommend hot compresses.  Due to itching, TobraDex.  Follow-up in 2 weeks if needed.  Orders:  -     tobramycin-dexamethasone (TOBRADEX) ophthalmic ointment; Administer 0.5 inches to the right eye 3 (three) times a day    15. Post-menopause  -     DXA bone density spine hip and pelvis; Future; Expected date: 04/25/2024        COVID 19 Instructions    Helena Rueda was advised to limit contact with others to essential tasks such as getting food, medications, and medical care.    Proper handwashing reviewed, and Hand sanitzer when washing is not available.    If the patient develops symptoms of COVID 19, the patient should call the office as soon as possible.    It is strongly recommended that Flu Vaccinations be obtained.      Virtual Visits:  [place]: This works on smart phones (any phone with Internet browsing capability).  You should get a text message when the provider is ready to see you.  Click on the link in the text message, and the call should start.  You will need to type in your name, and allow camera and microphone access.  This is HIPPA compliant, and secure.      If you have not already done so, get immunized to COVID 19.      We are committed to getting you vaccinated as soon as possible and will be closely  following CDC and [facility] guidelines as they are released and revised.  Please refer to our COVID-19 vaccine webpage for the most up to date information on the vaccine and our distribution efforts.    This site will also have the most up to date recommendations for COVID booster vaccine.    [email or web address].org/covid-19/[email or web address]-vaccine    Call [phone] ([phone]), option 7    You can also visit https://www.vaccines.gov/ to find vaccines in your area.    OUR LOCATION:    [facility]  [address] [place] [address], [address]  [place], [place], [address] [phone]  Fax: [phone]    Lab services, Rheumatology, and OB/GYN are at this location as well.

## 2024-04-25 NOTE — ASSESSMENT & PLAN NOTE
Patient has had significant elevations in cholesterol before.  This has been several years.  Would recommend checking lipid panel, CMP.  Follow-up afterwards.

## 2024-04-25 NOTE — PROGRESS NOTES
Assessment and Plan:     1. Mixed hyperlipidemia  Assessment & Plan:  Patient has had significant elevations in cholesterol before.  This has been several years.  Would recommend checking lipid panel, CMP.  Follow-up afterwards.    Orders:  -     Comprehensive metabolic panel; Future; Expected date: 04/25/2024  -     Lipid Panel with Direct LDL reflex; Future; Expected date: 04/25/2024  -     Comprehensive metabolic panel  -     Lipid Panel with Direct LDL reflex    2. Pain of left hip  Assessment & Plan:  Patient reports that the pain that she had is now much better, to the point where it is gone.  She did not get the MRI previously.  Again, her pain is gone so she did not feel it appropriate.  I would agree.      3. Lumbar disc disease  Assessment & Plan:  Patient reports that the pain that she had is now much better, to the point where it is gone.  She did not get the MRI previously.  Again, her pain is gone so she did not feel it appropriate.  I would agree.      4. Gastroesophageal reflux disease, unspecified whether esophagitis present  Assessment & Plan:  Patient reports sweep and acid causes more problems.  Does not take pantoprazole on a daily basis.  Usually will have some symptoms once a week.  Would recommend that she try medication, but could change to famotidine 20 mg from the pantoprazole if she wishes.  She is using omeprazole OTC when she has symptoms, and that seems to resolve it.  Could also take a Tums when she is having problems along with the omeprazole.    Orders:  -     CBC and differential; Future  -     CBC and differential    5. Encounter for annual wellness visit (AWV) in Medicare patient  Comments:  Reviewed health maintenance advanced directives, immunizations.    6. Need for RSV vaccination  Comments:  Recommend consider RSV vaccination.  Information given.    7. Need for COVID-19 vaccine  Comments:  Recommend updated 2023 COVID booster.    8. Need for vaccination for Strep  pneumoniae  Comments:  Recommend Prevnar 20.    9. Need for influenza vaccination  Comments:  Recommend yearly flu vaccine    10. Need for zoster vaccine  Comments:  Recommend Shingrix.  Would be a series of 2.  This is for shingles.    11. Breast cancer screening by mammogram  Comments:  Recommend mammogram.  This to be screening for breast cancer.  Orders:  -     Mammo screening bilateral w 3d & cad; Future    12. Encounter for screening colonoscopy  Comments:  Declined colonoscopy at this point.    13. Colon cancer screening  Comments:  Patient opts for Cologuard.  She does understand the colonoscopy may be needed if Cologuard positive.  Orders:  -     Cologuard    14. Hordeolum externum of right upper eyelid  Comments:  Recommend hot compresses.  Due to itching, TobraDex.  Follow-up in 2 weeks if needed.  Orders:  -     tobramycin-dexamethasone (TOBRADEX) ophthalmic ointment; Administer 0.5 inches to the right eye 3 (three) times a day    15. Post-menopause  -     DXA bone density spine hip and pelvis; Future; Expected date: 04/25/2024             Preventive health issues were discussed with patient, and age appropriate screening tests were ordered as noted in patient's After Visit Summary.  Personalized health advice and appropriate referrals for health education or preventive services given if needed, as noted in patient's After Visit Summary.  Chief Complaint   Patient presents with   • Medicare Wellness Visit     Patient present today for her medicare wellness visits.   • Stye     Possible stye on her right eye.        History of Present Illness:     Patient presents for a Medicare Wellness Visit    Patient is here today for annual wellness visit, but also discusses that she has a possible stye on the right.    OD: Some redness and irritation last night, with itching.  It is in the upper lid, nasal aspect.       Patient Care Team:  Jonatan Newsome MD as PCP - General (Family Medicine)     Review of  Systems:     Review of Systems   Constitutional: Negative.    HENT: Negative.     Eyes: Negative.         Per HPI     Respiratory: Negative.     Cardiovascular: Negative.    Gastrointestinal: Negative.    Endocrine: Negative.    Genitourinary: Negative.    Musculoskeletal: Negative.    Skin: Negative.    Allergic/Immunologic: Negative.    Neurological: Negative.    Hematological: Negative.    Psychiatric/Behavioral: Negative.          Problem List:     Patient Active Problem List   Diagnosis   • Skin lesion of face   • Allergic rhinitis   • Degenerative joint disease of knee, right   • Esophageal reflux   • Sprain of MCL (medial collateral ligament) of knee   • Sensation of cold in leg   • Adnexal cyst   • Dermatitis   • Hyperlipidemia   • Post-menopause   • Hip pain   • Lumbar disc disease      Past Medical and Surgical History:     Past Medical History:   Diagnosis Date   • COVID-19 virus infection 01/10/2024    Positive test, 9 January 2024.   • Medial epicondylitis of elbow, unspecified laterality     Last Assessed: 9/26/2014   • Vertigo, benign paroxysmal, unspecified laterality     Last Assessed: 1/30/2013     Past Surgical History:   Procedure Laterality Date   • ABDOMINO-VAGINAL VESICAL NECK SUSPENSION     • BREAST BIOPSY Left     Benign   • OOPHORECTOMY      Unilateral      Family History:     Family History   Problem Relation Age of Onset   • Diabetes Mother    • Hypertension Mother    • Hyperlipidemia Mother         Pure   • Cancer Father         Gastric   • Cancer Sister         unknown type   • No Known Problems Sister    • No Known Problems Sister    • No Known Problems Sister    • No Known Problems Sister    • No Known Problems Daughter    • Coronary artery disease Maternal Grandmother    • No Known Problems Maternal Grandfather    • No Known Problems Paternal Grandmother    • Coronary artery disease Paternal Grandfather       Social History:     Social History     Socioeconomic History   • Marital  status:      Spouse name: None   • Number of children: None   • Years of education: None   • Highest education level: None   Occupational History   • None   Tobacco Use   • Smoking status: Never   • Smokeless tobacco: Never   • Tobacco comments:     no secondhand smoke exposure   Vaping Use   • Vaping status: Never Used   Substance and Sexual Activity   • Alcohol use: Yes     Comment: social   • Drug use: Never   • Sexual activity: None   Other Topics Concern   • None   Social History Narrative    Employed    Single     Social Determinants of Health     Financial Resource Strain: Low Risk  (4/24/2023)    Overall Financial Resource Strain (CARDIA)    • Difficulty of Paying Living Expenses: Not hard at all   Food Insecurity: No Food Insecurity (4/25/2024)    Hunger Vital Sign    • Worried About Running Out of Food in the Last Year: Never true    • Ran Out of Food in the Last Year: Never true   Transportation Needs: No Transportation Needs (4/25/2024)    PRAPARE - Transportation    • Lack of Transportation (Medical): No    • Lack of Transportation (Non-Medical): No   Physical Activity: Not on file   Stress: Not on file   Social Connections: Not on file   Intimate Partner Violence: Not on file   Housing Stability: Low Risk  (4/25/2024)    Housing Stability Vital Sign    • Unable to Pay for Housing in the Last Year: No    • Number of Places Lived in the Last Year: 1    • Unstable Housing in the Last Year: No      Medications and Allergies:     Current Outpatient Medications   Medication Sig Dispense Refill   • aspirin 81 MG tablet Take by mouth     • CALCIUM PO Take by mouth     • MAGNESIUM PO Take by mouth     • meloxicam (Mobic) 15 mg tablet Take 1 tablet (15 mg total) by mouth daily 30 tablet 0   • tobramycin-dexamethasone (TOBRADEX) ophthalmic ointment Administer 0.5 inches to the right eye 3 (three) times a day 3.5 g 0   • medroxyPROGESTERone (PROVERA) 2.5 mg tablet Take 2.5 mg by mouth in the morning.  (Patient not taking: Reported on 2/8/2024)     • pantoprazole (PROTONIX) 40 mg tablet TAKE 1 TABLET BY MOUTH EVERY DAY BEFORE BREAKFAST 90 tablet 1     No current facility-administered medications for this visit.     No Known Allergies   Immunizations:     Immunization History   Administered Date(s) Administered   • COVID-19 PFIZER VACCINE 0.3 ML IM 02/24/2021, 03/17/2021, 10/20/2021   • COVID-19 Pfizer Vac BIVALENT Uli-sucrose 12 Yr+ IM 11/22/2022      Health Maintenance:         Topic Date Due   • Hepatitis C Screening  Never done   • Colorectal Cancer Screening  03/02/2021   • Breast Cancer Screening: Mammogram  05/23/2024         Topic Date Due   • Pneumococcal Vaccine: 65+ Years (1 of 1 - PCV) Never done   • Influenza Vaccine (1) Never done   • COVID-19 Vaccine (5 - 2023-24 season) 09/01/2023      Medicare Screening Tests and Risk Assessments:     Helena is here for her Subsequent Wellness visit.     Health Risk Assessment:   Patient rates overall health as very good. Patient feels that their physical health rating is same. Patient is very satisfied with their life. Eyesight was rated as slightly worse. Hearing was rated as same. Patient feels that their emotional and mental health rating is same. Patients states they are sometimes angry. Patient states they are often unusually tired/fatigued. Pain experienced in the last 7 days has been none. Patient states that she has experienced no weight loss or gain in last 6 months.     Fall Risk Screening:   In the past year, patient has experienced: no history of falling in past year      Urinary Incontinence Screening:   Patient has not leaked urine accidently in the last six months.     Home Safety:  Patient does not have trouble with stairs inside or outside of their home. Patient has working smoke alarms and has working carbon monoxide detector. Home safety hazards include: none.     Nutrition:   Current diet is Regular.     Medications:   Patient is able to  manage medications.     Activities of Daily Living (ADLs)/Instrumental Activities of Daily Living (IADLs):   Walk and transfer into and out of bed and chair?: Yes  Dress and groom yourself?: Yes    Bathe or shower yourself?: Yes    Feed yourself? Yes  Do your laundry/housekeeping?: Yes  Manage your money, pay your bills and track your expenses?: Yes  Make your own meals?: Yes    Do your own shopping?: Yes    Previous Hospitalizations:   Any hospitalizations or ED visits within the last 12 months?: No      Advance Care Planning:   Living will: No    Durable POA for healthcare: No    Advanced directive: No    Advanced directive counseling given: Yes    ACP document given: Yes      Cognitive Screening:   Provider or family/friend/caregiver concerned regarding cognition?: No    PREVENTIVE SCREENINGS      Cardiovascular Screening:    General: History Lipid Disorder    Due for: Lipid Panel      Diabetes Screening:     General: Risks and Benefits Discussed    Due for: Blood Glucose      Colorectal Cancer Screening:     General: Risks and Benefits Discussed    Due for: Cologuard      Breast Cancer Screening:     General: Screening Current    Due for: Mammogram        Cervical Cancer Screening:    General: Screening Not Indicated      Osteoporosis Screening:    General: Risks and Benefits Discussed    Due for: DXA Axial      Abdominal Aortic Aneurysm (AAA) Screening:        General: Screening Not Indicated      Lung Cancer Screening:     General: Screening Not Indicated      Hepatitis C Screening:    General: Screening Not Indicated    Screening, Brief Intervention, and Referral to Treatment (SBIRT)    Screening  Typical number of drinks in a day: 0  Typical number of drinks in a week: 1  Interpretation: Low risk drinking behavior.    Single Item Drug Screening:  How often have you used an illegal drug (including marijuana) or a prescription medication for non-medical reasons in the past year? never    Single Item Drug  "Screen Score: 0  Interpretation: Negative screen for possible drug use disorder    No results found.     Physical Exam:     /68 (BP Location: Left arm, Patient Position: Sitting, Cuff Size: Standard)   Pulse 87   Ht 5' 1\" (1.549 m)   Wt 55.3 kg (122 lb)   SpO2 98%   BMI 23.05 kg/m²     Physical Exam  Vitals and nursing note reviewed.   Constitutional:       Appearance: Normal appearance.   HENT:      Head: Normocephalic.   Eyes:     Cardiovascular:      Rate and Rhythm: Normal rate and regular rhythm.      Pulses: Normal pulses.           Carotid pulses are 2+ on the right side and 2+ on the left side.     Heart sounds: Normal heart sounds. No murmur heard.     No friction rub. No gallop.   Pulmonary:      Effort: Pulmonary effort is normal. No respiratory distress.      Breath sounds: Normal breath sounds. No stridor. No wheezing, rhonchi or rales.   Chest:      Chest wall: No tenderness.   Neurological:      Mental Status: She is alert.        Jonatan Newsome MD  "

## 2024-04-25 NOTE — ASSESSMENT & PLAN NOTE
Patient reports that the pain that she had is now much better, to the point where it is gone.  She did not get the MRI previously.  Again, her pain is gone so she did not feel it appropriate.  I would agree.

## 2024-04-30 ENCOUNTER — APPOINTMENT (OUTPATIENT)
Dept: LAB | Facility: MEDICAL CENTER | Age: 69
End: 2024-04-30
Payer: COMMERCIAL

## 2024-04-30 DIAGNOSIS — K21.00 GASTROESOPHAGEAL REFLUX DISEASE WITH ESOPHAGITIS, UNSPECIFIED WHETHER HEMORRHAGE: ICD-10-CM

## 2024-04-30 DIAGNOSIS — E78.2 MIXED HYPERLIPIDEMIA: ICD-10-CM

## 2024-04-30 LAB
BASOPHILS # BLD AUTO: 0.02 THOUSANDS/ÂΜL (ref 0–0.1)
BASOPHILS NFR BLD AUTO: 0 % (ref 0–1)
CHOLEST SERPL-MCNC: 214 MG/DL
EOSINOPHIL # BLD AUTO: 0.27 THOUSAND/ÂΜL (ref 0–0.61)
EOSINOPHIL NFR BLD AUTO: 4 % (ref 0–6)
ERYTHROCYTE [DISTWIDTH] IN BLOOD BY AUTOMATED COUNT: 13.2 % (ref 11.6–15.1)
HCT VFR BLD AUTO: 46.2 % (ref 34.8–46.1)
HDLC SERPL-MCNC: 41 MG/DL
HGB BLD-MCNC: 14.7 G/DL (ref 11.5–15.4)
IMM GRANULOCYTES # BLD AUTO: 0.02 THOUSAND/UL (ref 0–0.2)
IMM GRANULOCYTES NFR BLD AUTO: 0 % (ref 0–2)
LDLC SERPL CALC-MCNC: 136 MG/DL (ref 0–100)
LYMPHOCYTES # BLD AUTO: 3 THOUSANDS/ÂΜL (ref 0.6–4.47)
LYMPHOCYTES NFR BLD AUTO: 43 % (ref 14–44)
MCH RBC QN AUTO: 28.1 PG (ref 26.8–34.3)
MCHC RBC AUTO-ENTMCNC: 31.8 G/DL (ref 31.4–37.4)
MCV RBC AUTO: 88 FL (ref 82–98)
MONOCYTES # BLD AUTO: 0.39 THOUSAND/ÂΜL (ref 0.17–1.22)
MONOCYTES NFR BLD AUTO: 6 % (ref 4–12)
NEUTROPHILS # BLD AUTO: 3.21 THOUSANDS/ÂΜL (ref 1.85–7.62)
NEUTS SEG NFR BLD AUTO: 47 % (ref 43–75)
NONHDLC SERPL-MCNC: 173 MG/DL
NRBC BLD AUTO-RTO: 0 /100 WBCS
PLATELET # BLD AUTO: 247 THOUSANDS/UL (ref 149–390)
PMV BLD AUTO: 9.3 FL (ref 8.9–12.7)
RBC # BLD AUTO: 5.24 MILLION/UL (ref 3.81–5.12)
TRIGL SERPL-MCNC: 187 MG/DL
WBC # BLD AUTO: 6.91 THOUSAND/UL (ref 4.31–10.16)

## 2024-04-30 PROCEDURE — 80053 COMPREHEN METABOLIC PANEL: CPT

## 2024-04-30 PROCEDURE — 85025 COMPLETE CBC W/AUTO DIFF WBC: CPT

## 2024-04-30 PROCEDURE — 36415 COLL VENOUS BLD VENIPUNCTURE: CPT

## 2024-05-08 LAB
ALBUMIN SERPL BCP-MCNC: 4.4 G/DL (ref 3.5–5)
ALP SERPL-CCNC: 67 U/L (ref 34–104)
ALT SERPL W P-5'-P-CCNC: 31 U/L (ref 7–52)
ANION GAP SERPL CALCULATED.3IONS-SCNC: 9 MMOL/L (ref 4–13)
AST SERPL W P-5'-P-CCNC: 19 U/L (ref 13–39)
BILIRUB SERPL-MCNC: 0.53 MG/DL (ref 0.2–1)
BUN SERPL-MCNC: 23 MG/DL (ref 5–25)
CALCIUM SERPL-MCNC: 9.9 MG/DL (ref 8.4–10.2)
CHLORIDE SERPL-SCNC: 105 MMOL/L (ref 96–108)
CO2 SERPL-SCNC: 27 MMOL/L (ref 21–32)
CREAT SERPL-MCNC: 0.7 MG/DL (ref 0.6–1.3)
GFR SERPL CREATININE-BSD FRML MDRD: 88 ML/MIN/1.73SQ M
GLUCOSE P FAST SERPL-MCNC: 97 MG/DL (ref 65–99)
POTASSIUM SERPL-SCNC: 4 MMOL/L (ref 3.5–5.3)
PROT SERPL-MCNC: 6.9 G/DL (ref 6.4–8.4)
SODIUM SERPL-SCNC: 141 MMOL/L (ref 135–147)

## 2024-05-28 DIAGNOSIS — E78.2 MIXED HYPERLIPIDEMIA: Primary | ICD-10-CM

## 2024-05-28 RX ORDER — ATORVASTATIN CALCIUM 10 MG/1
10 TABLET, FILM COATED ORAL DAILY
Qty: 90 TABLET | Refills: 3 | Status: SHIPPED | OUTPATIENT
Start: 2024-05-28

## 2024-06-17 ENCOUNTER — TELEPHONE (OUTPATIENT)
Age: 69
End: 2024-06-17

## 2024-06-25 ENCOUNTER — HOSPITAL ENCOUNTER (OUTPATIENT)
Dept: MAMMOGRAPHY | Facility: MEDICAL CENTER | Age: 69
Discharge: HOME | End: 2024-06-25
Payer: COMMERCIAL

## 2024-06-25 VITALS — BODY MASS INDEX: 23.03 KG/M2 | WEIGHT: 122 LBS | HEIGHT: 61 IN

## 2024-06-25 DIAGNOSIS — Z12.31 BREAST CANCER SCREENING BY MAMMOGRAM: ICD-10-CM

## 2024-06-25 PROCEDURE — 77067 SCR MAMMO BI INCL CAD: CPT

## 2024-06-25 PROCEDURE — 77063 BREAST TOMOSYNTHESIS BI: CPT

## 2024-06-27 DIAGNOSIS — M51.9 LUMBAR DISC DISEASE: Primary | ICD-10-CM

## 2024-07-09 LAB — COLOGUARD RESULT REPORTABLE: NEGATIVE

## 2024-08-06 NOTE — RESULT ENCOUNTER NOTE
Tests were not normal, and should follow at  your scheduled Office visit. Please call about this, if [place] message is not available or not read by patient.  
[FreeTextEntry2] : Follow up- Neck

## 2024-10-15 ENCOUNTER — RA CDI HCC (OUTPATIENT)
Dept: OTHER | Facility: HOSPITAL | Age: 69
End: 2024-10-15

## 2024-10-15 ENCOUNTER — OFFICE VISIT (OUTPATIENT)
Dept: FAMILY MEDICINE CLINIC | Facility: CLINIC | Age: 69
End: 2024-10-15
Payer: COMMERCIAL

## 2024-10-15 VITALS
HEART RATE: 105 BPM | DIASTOLIC BLOOD PRESSURE: 44 MMHG | WEIGHT: 121 LBS | HEIGHT: 61 IN | BODY MASS INDEX: 22.84 KG/M2 | SYSTOLIC BLOOD PRESSURE: 110 MMHG | TEMPERATURE: 101.9 F | OXYGEN SATURATION: 95 %

## 2024-10-15 DIAGNOSIS — R50.9 FEVER, UNSPECIFIED FEVER CAUSE: Primary | ICD-10-CM

## 2024-10-15 LAB
S PYO AG THROAT QL: NEGATIVE
SARS-COV-2 AG UPPER RESP QL IA: NEGATIVE
SL AMB POCT RAPID FLU A: NORMAL
SL AMB POCT RAPID FLU B: NORMAL
VALID CONTROL: NORMAL

## 2024-10-15 PROCEDURE — 87880 STREP A ASSAY W/OPTIC: CPT | Performed by: FAMILY MEDICINE

## 2024-10-15 PROCEDURE — 87804 INFLUENZA ASSAY W/OPTIC: CPT | Performed by: FAMILY MEDICINE

## 2024-10-15 PROCEDURE — 99213 OFFICE O/P EST LOW 20 MIN: CPT | Performed by: FAMILY MEDICINE

## 2024-10-15 PROCEDURE — 87811 SARS-COV-2 COVID19 W/OPTIC: CPT | Performed by: FAMILY MEDICINE

## 2024-10-15 RX ORDER — OMEPRAZOLE 20 MG/1
20 TABLET, DELAYED RELEASE ORAL DAILY
COMMUNITY

## 2024-10-15 NOTE — PROGRESS NOTES
"Ambulatory Visit  Name: Helena Rueda      : 1955      MRN: [id number]  Encounter Provider: Sybil Rayo MD  Encounter Date: 10/15/2024   Encounter department: [facility]    Assessment & Plan  Fever, unspecified fever cause  Check for flu, covid and strep, if negative next step would  be cbc and lyme, monospot,aso titer, alternate tylenol 100 mg with motrin 600 mg every 3 hours, cold washclothes    Orders:    POCT rapid flu A and B    POCT rapid ANTIGEN strepA    POCT Rapid Covid Ag    Antistreptolysin O titer; Future    Mononucleosis screen; Future    CBC and differential    Lyme Total AB W Reflex to IGM/IGG; Future    Mononucleosis screen    Lyme Total AB W Reflex to IGM/IGG       History of Present Illness     Patient presents with:  Fever: Chills and shivers, t 104.3, no sick exposure  Onset 2 days.  She has similar symptoms 3 weeks ago.was covid negative  Fatigue  Generalized Body Aches, no hx tick bite   Sore throat          Review of Systems   Constitutional:  Positive for activity change, appetite change, chills, fatigue and fever.   HENT:  Positive for sore throat. Negative for congestion, ear pain, facial swelling and rhinorrhea.    Respiratory:  Negative for cough.    Cardiovascular:  Negative for chest pain.   Gastrointestinal:  Positive for nausea and vomiting. Negative for abdominal pain and diarrhea.   Genitourinary:  Negative for dysuria.   Musculoskeletal:  Positive for myalgias.   Skin:  Positive for rash.   Neurological:  Positive for dizziness, light-headedness and headaches.   Hematological:  Negative for adenopathy.           Objective     BP (!) 110/44   Pulse 105   Temp (!) 101.9 °F (38.8 °C)   Ht 5' 1\" (1.549 m)   Wt 54.9 kg (121 lb)   SpO2 95%   BMI 22.86 kg/m²     Physical Exam  Vitals reviewed.   Constitutional:       Appearance: Normal appearance. She is ill-appearing.   HENT:      Right Ear: Tympanic membrane normal.      Left Ear: Tympanic membrane " normal.      Nose: No congestion or rhinorrhea.      Mouth/Throat:      Pharynx: No oropharyngeal exudate or posterior oropharyngeal erythema.   Cardiovascular:      Rate and Rhythm: Regular rhythm. Tachycardia present.      Pulses: Normal pulses.      Heart sounds: Normal heart sounds.   Pulmonary:      Effort: Pulmonary effort is normal.      Breath sounds: Normal breath sounds.   Lymphadenopathy:      Cervical: No cervical adenopathy.   Neurological:      Mental Status: She is alert.

## 2024-10-16 ENCOUNTER — APPOINTMENT (OUTPATIENT)
Dept: LAB | Facility: MEDICAL CENTER | Age: 69
End: 2024-10-16
Payer: COMMERCIAL

## 2024-10-16 DIAGNOSIS — E78.2 MIXED HYPERLIPIDEMIA: ICD-10-CM

## 2024-10-16 DIAGNOSIS — R50.9 FEVER, UNSPECIFIED FEVER CAUSE: ICD-10-CM

## 2024-10-16 LAB
ALBUMIN SERPL BCG-MCNC: 3.6 G/DL (ref 3.5–5)
ALP SERPL-CCNC: 74 U/L (ref 34–104)
ALT SERPL W P-5'-P-CCNC: 25 U/L (ref 7–52)
ANION GAP SERPL CALCULATED.3IONS-SCNC: 11 MMOL/L (ref 4–13)
AST SERPL W P-5'-P-CCNC: 19 U/L (ref 13–39)
BILIRUB SERPL-MCNC: 0.53 MG/DL (ref 0.2–1)
BUN SERPL-MCNC: 14 MG/DL (ref 5–25)
CALCIUM SERPL-MCNC: 9.2 MG/DL (ref 8.4–10.2)
CHLORIDE SERPL-SCNC: 106 MMOL/L (ref 96–108)
CHOLEST SERPL-MCNC: 144 MG/DL
CO2 SERPL-SCNC: 23 MMOL/L (ref 21–32)
CREAT SERPL-MCNC: 0.75 MG/DL (ref 0.6–1.3)
GFR SERPL CREATININE-BSD FRML MDRD: 81 ML/MIN/1.73SQ M
GLUCOSE P FAST SERPL-MCNC: 119 MG/DL (ref 65–99)
HDLC SERPL-MCNC: 45 MG/DL
LDLC SERPL CALC-MCNC: 83 MG/DL (ref 0–100)
POTASSIUM SERPL-SCNC: 3.3 MMOL/L (ref 3.5–5.3)
PROT SERPL-MCNC: 6.6 G/DL (ref 6.4–8.4)
SODIUM SERPL-SCNC: 140 MMOL/L (ref 135–147)
TRIGL SERPL-MCNC: 82 MG/DL

## 2024-10-16 PROCEDURE — 86618 LYME DISEASE ANTIBODY: CPT

## 2024-10-16 PROCEDURE — 80053 COMPREHEN METABOLIC PANEL: CPT

## 2024-10-16 PROCEDURE — 36415 COLL VENOUS BLD VENIPUNCTURE: CPT

## 2024-10-16 PROCEDURE — 80061 LIPID PANEL: CPT

## 2024-10-16 PROCEDURE — 86063 ANTISTREPTOLYSIN O SCREEN: CPT

## 2024-10-16 NOTE — ASSESSMENT & PLAN NOTE
Check for flu, covid and strep, if negative next step would  be cbc and lyme, monospot,aso titer, alternate tylenol 100 mg with motrin 600 mg every 3 hours, cold washclothes    Orders:    POCT rapid flu A and B    POCT rapid ANTIGEN strepA    POCT Rapid Covid Ag    Antistreptolysin O titer; Future    Mononucleosis screen; Future    CBC and differential    Lyme Total AB W Reflex to IGM/IGG; Future    Mononucleosis screen    Lyme Total AB W Reflex to IGM/IGG

## 2024-10-17 ENCOUNTER — APPOINTMENT (OUTPATIENT)
Dept: LAB | Facility: MEDICAL CENTER | Age: 69
End: 2024-10-17
Payer: COMMERCIAL

## 2024-10-17 DIAGNOSIS — E87.6 HYPOKALEMIA: Primary | ICD-10-CM

## 2024-10-17 LAB
ASO AB TITR SER LA: NORMAL {TITER}
B BURGDOR IGG+IGM SER QL IA: NEGATIVE
BASOPHILS # BLD AUTO: 0.01 THOUSANDS/ΜL (ref 0–0.1)
BASOPHILS NFR BLD AUTO: 0 % (ref 0–1)
EOSINOPHIL # BLD AUTO: 0.02 THOUSAND/ΜL (ref 0–0.61)
EOSINOPHIL NFR BLD AUTO: 0 % (ref 0–6)
ERYTHROCYTE [DISTWIDTH] IN BLOOD BY AUTOMATED COUNT: 13.3 % (ref 11.6–15.1)
HCT VFR BLD AUTO: 39.9 % (ref 34.8–46.1)
HETEROPH AB SER QL: NEGATIVE
HGB BLD-MCNC: 12.8 G/DL (ref 11.5–15.4)
IMM GRANULOCYTES # BLD AUTO: 0.04 THOUSAND/UL (ref 0–0.2)
IMM GRANULOCYTES NFR BLD AUTO: 0 % (ref 0–2)
LYMPHOCYTES # BLD AUTO: 1.33 THOUSANDS/ΜL (ref 0.6–4.47)
LYMPHOCYTES NFR BLD AUTO: 13 % (ref 14–44)
MCH RBC QN AUTO: 28.3 PG (ref 26.8–34.3)
MCHC RBC AUTO-ENTMCNC: 32.1 G/DL (ref 31.4–37.4)
MCV RBC AUTO: 88 FL (ref 82–98)
MONOCYTES # BLD AUTO: 0.9 THOUSAND/ΜL (ref 0.17–1.22)
MONOCYTES NFR BLD AUTO: 9 % (ref 4–12)
NEUTROPHILS # BLD AUTO: 7.83 THOUSANDS/ΜL (ref 1.85–7.62)
NEUTS SEG NFR BLD AUTO: 78 % (ref 43–75)
NRBC BLD AUTO-RTO: 0 /100 WBCS
PLATELET # BLD AUTO: 262 THOUSANDS/UL (ref 149–390)
PMV BLD AUTO: 9.5 FL (ref 8.9–12.7)
RBC # BLD AUTO: 4.53 MILLION/UL (ref 3.81–5.12)
WBC # BLD AUTO: 10.13 THOUSAND/UL (ref 4.31–10.16)

## 2024-10-17 PROCEDURE — 85025 COMPLETE CBC W/AUTO DIFF WBC: CPT

## 2024-10-17 PROCEDURE — 36415 COLL VENOUS BLD VENIPUNCTURE: CPT

## 2024-10-17 PROCEDURE — 86308 HETEROPHILE ANTIBODY SCREEN: CPT

## 2024-10-18 ENCOUNTER — OFFICE VISIT (OUTPATIENT)
Dept: FAMILY MEDICINE CLINIC | Facility: CLINIC | Age: 69
End: 2024-10-18
Payer: COMMERCIAL

## 2024-10-18 VITALS
WEIGHT: 125 LBS | TEMPERATURE: 98.7 F | HEIGHT: 61 IN | DIASTOLIC BLOOD PRESSURE: 60 MMHG | HEART RATE: 82 BPM | OXYGEN SATURATION: 98 % | SYSTOLIC BLOOD PRESSURE: 110 MMHG | BODY MASS INDEX: 23.6 KG/M2

## 2024-10-18 DIAGNOSIS — J06.9 VIRAL UPPER RESPIRATORY TRACT INFECTION: ICD-10-CM

## 2024-10-18 DIAGNOSIS — R50.9 FEVER, UNSPECIFIED FEVER CAUSE: Primary | ICD-10-CM

## 2024-10-18 LAB
SARS-COV-2 AG UPPER RESP QL IA: NEGATIVE
VALID CONTROL: NORMAL

## 2024-10-18 PROCEDURE — 99213 OFFICE O/P EST LOW 20 MIN: CPT | Performed by: FAMILY MEDICINE

## 2024-10-18 PROCEDURE — 87811 SARS-COV-2 COVID19 W/OPTIC: CPT | Performed by: FAMILY MEDICINE

## 2024-10-18 PROCEDURE — G2211 COMPLEX E/M VISIT ADD ON: HCPCS | Performed by: FAMILY MEDICINE

## 2024-10-18 RX ORDER — AZITHROMYCIN 250 MG/1
TABLET, FILM COATED ORAL
Qty: 6 TABLET | Refills: 0 | Status: SHIPPED | OUTPATIENT
Start: 2024-10-18 | End: 2024-10-23

## 2024-10-18 NOTE — ASSESSMENT & PLAN NOTE
Continues to be febrile.  I would recommend to repeat the COVID test as it was 1 day of symptoms before she had the test done, which could be too early to catch COVID.  Reviewed about Paxlovid, there is some interaction with [employer] a reductase inhibitors, her atorvastatin.  Would recommend that she hold the atorvastatin if we need to use Paxlovid.  Otherwise, we will start her on antibiotics, there does not appear to be a specific source for her symptoms.  No urinary symptoms, lungs sound reasonable.  Uvula is minimally erythematous, but certainly not enlarged.  Did not seem to have ear infections, lungs were clear.  Will follow-up afterwards.  Did review her labs as well, and those were also normal.    Orders:    Poct Covid 19 Rapid Antigen Test    azithromycin (ZITHROMAX) 250 mg tablet; Take 2 tablets on day 1, then 1 tablet daily days 2 through 5

## 2024-10-18 NOTE — PROGRESS NOTES
Ambulatory Visit  Name: Helena Rueda      : 1955      MRN: [id number]  Encounter Provider: Jonatan Newsome MD  Encounter Date: 10/18/2024   Encounter department: [facility]    Assessment & Plan  Fever, unspecified fever cause  Continues to be febrile.  I would recommend to repeat the COVID test as it was 1 day of symptoms before she had the test done, which could be too early to catch COVID.  Reviewed about Paxlovid, there is some interaction with HMG [employer]. a reductase inhibitors, her atorvastatin.  Would recommend that she hold the atorvastatin if we need to use Paxlovid.  Otherwise, we will start her on antibiotics, there does not appear to be a specific source for her symptoms.  No urinary symptoms, lungs sound reasonable.  Uvula is minimally erythematous, but certainly not enlarged.  Did not seem to have ear infections, lungs were clear.  Will follow-up afterwards.  Did review her labs as well, and those were also normal.    Orders:    Poct Covid 19 Rapid Antigen Test    azithromycin (ZITHROMAX) 250 mg tablet; Take 2 tablets on day 1, then 1 tablet daily days 2 through 5    Viral upper respiratory tract infection    Orders:    Poct Covid 19 Rapid Antigen Test    azithromycin (ZITHROMAX) 250 mg tablet; Take 2 tablets on day 1, then 1 tablet daily days 2 through 5       History of Present Illness     Fever feels a bit better today.  Despite this, still having chills.    Some swelling in Uvula this AM.  Patient does have a history of tonsillectomy previously.  When she gets sick, she did mention that the uvula does tend to get swollen for her.    No other real symptoms.  No changes in smell or taste.  Feels weak and tired, but not specifically fatigue and myalgia.  Some postnasal drip, but consistent with prior to infection.    Seem to start on  or Monday.          Review of Systems        Objective     /60 (BP Location: Right arm, Patient Position: Sitting, Cuff  "Size: Standard)   Pulse 82   Temp 98.7 °F (37.1 °C) (Temporal)   Ht 5' 1\" (1.549 m)   Wt 56.7 kg (125 lb)   SpO2 98%   BMI 23.62 kg/m²     White count 10.13, hemoglobin 12.8, hematocrit 39.9, platelets 262.    Mono negative.  ASO negative.  Lyme negative.  Sodium 140, potassium 3.3, calcium 9.2.  Blood sugar 119.  AST 19, ALT 25.  Creatinine 0.75, GFR 81.  Total cholesterol 144, LDL 83, HDL 45, triglycerides 82.    Physical Exam    "

## 2024-10-29 ENCOUNTER — LAB (OUTPATIENT)
Dept: LAB | Facility: CLINIC | Age: 69
End: 2024-10-29
Payer: COMMERCIAL

## 2024-10-29 ENCOUNTER — OFFICE VISIT (OUTPATIENT)
Dept: FAMILY MEDICINE CLINIC | Facility: CLINIC | Age: 69
End: 2024-10-29
Payer: COMMERCIAL

## 2024-10-29 VITALS
HEART RATE: 76 BPM | BODY MASS INDEX: 23.41 KG/M2 | RESPIRATION RATE: 14 BRPM | WEIGHT: 124 LBS | HEIGHT: 61 IN | OXYGEN SATURATION: 98 % | SYSTOLIC BLOOD PRESSURE: 114 MMHG | TEMPERATURE: 97.5 F | DIASTOLIC BLOOD PRESSURE: 68 MMHG

## 2024-10-29 DIAGNOSIS — J06.9 VIRAL UPPER RESPIRATORY TRACT INFECTION: Primary | ICD-10-CM

## 2024-10-29 DIAGNOSIS — Z23 NEED FOR COVID-19 VACCINE: ICD-10-CM

## 2024-10-29 DIAGNOSIS — Z23 NEED FOR INFLUENZA VACCINATION: ICD-10-CM

## 2024-10-29 DIAGNOSIS — R50.9 FEVER, UNSPECIFIED FEVER CAUSE: ICD-10-CM

## 2024-10-29 DIAGNOSIS — M25.552 PAIN OF LEFT HIP: ICD-10-CM

## 2024-10-29 DIAGNOSIS — E78.2 MIXED HYPERLIPIDEMIA: Primary | ICD-10-CM

## 2024-10-29 DIAGNOSIS — E78.2 MIXED HYPERLIPIDEMIA: ICD-10-CM

## 2024-10-29 DIAGNOSIS — M51.9 LUMBAR DISC DISEASE: ICD-10-CM

## 2024-10-29 DIAGNOSIS — E87.6 HYPOKALEMIA: ICD-10-CM

## 2024-10-29 LAB
ALBUMIN SERPL BCG-MCNC: 4.1 G/DL (ref 3.5–5)
ALP SERPL-CCNC: 85 U/L (ref 34–104)
ALT SERPL W P-5'-P-CCNC: 29 U/L (ref 7–52)
ANION GAP SERPL CALCULATED.3IONS-SCNC: 8 MMOL/L (ref 4–13)
AST SERPL W P-5'-P-CCNC: 18 U/L (ref 13–39)
BILIRUB SERPL-MCNC: 0.25 MG/DL (ref 0.2–1)
BUN SERPL-MCNC: 13 MG/DL (ref 5–25)
CALCIUM SERPL-MCNC: 10.2 MG/DL (ref 8.4–10.2)
CHLORIDE SERPL-SCNC: 105 MMOL/L (ref 96–108)
CHOLEST SERPL-MCNC: 206 MG/DL
CO2 SERPL-SCNC: 28 MMOL/L (ref 21–32)
CREAT SERPL-MCNC: 0.85 MG/DL (ref 0.6–1.3)
GFR SERPL CREATININE-BSD FRML MDRD: 69 ML/MIN/1.73SQ M
GLUCOSE SERPL-MCNC: 96 MG/DL (ref 65–140)
HDLC SERPL-MCNC: 39 MG/DL
LDLC SERPL DIRECT ASSAY-MCNC: 128 MG/DL (ref 0–100)
POTASSIUM SERPL-SCNC: 4.8 MMOL/L (ref 3.5–5.3)
PROT SERPL-MCNC: 6.9 G/DL (ref 6.4–8.4)
SODIUM SERPL-SCNC: 141 MMOL/L (ref 135–147)

## 2024-10-29 PROCEDURE — 80053 COMPREHEN METABOLIC PANEL: CPT

## 2024-10-29 PROCEDURE — G2211 COMPLEX E/M VISIT ADD ON: HCPCS | Performed by: FAMILY MEDICINE

## 2024-10-29 PROCEDURE — 83718 ASSAY OF LIPOPROTEIN: CPT

## 2024-10-29 PROCEDURE — 82465 ASSAY BLD/SERUM CHOLESTEROL: CPT

## 2024-10-29 PROCEDURE — 36415 COLL VENOUS BLD VENIPUNCTURE: CPT

## 2024-10-29 PROCEDURE — 99214 OFFICE O/P EST MOD 30 MIN: CPT | Performed by: FAMILY MEDICINE

## 2024-10-29 PROCEDURE — 83721 ASSAY OF BLOOD LIPOPROTEIN: CPT

## 2024-10-29 NOTE — ASSESSMENT & PLAN NOTE
Occasionally has some symptoms, especially with laying down.  When she is up and walking around, does not seem to cause as many problems.  If there is worsening of her symptoms, would recommend that she obtain the MRI that was ordered before.         
Patient's cholesterol is actually doing better than what it was before.  She reports she is not taking the atorvastatin.  Check in 6 months.  Reevaluate.    Orders:    Cholesterol, total; Future    Comprehensive metabolic panel; Future    HDL cholesterol; Future    LDL cholesterol, direct; Future    Cholesterol, total    Comprehensive metabolic panel    HDL cholesterol    LDL cholesterol, direct    
Some of her hip pain could be related to lumbar disc disease.  Currently, it does not bother her too much.  If she has increased symptoms or problems, obtain the MRI that was ordered previously.         
FEVER

## 2024-10-29 NOTE — PATIENT INSTRUCTIONS
1. Viral upper respiratory tract infection  Comments:  Resolved after antibiotics.  No further follow-up needed.  2. Fever, unspecified fever cause  Comments:  Resolved after antibiotics.  No further follow-up needed.  Assessment & Plan:         3. Mixed hyperlipidemia  Assessment & Plan:  Patient's cholesterol is actually doing better than what it was before.  She reports she is not taking the atorvastatin.  Check in 6 months.  Reevaluate.    Orders:    Cholesterol, total; Future    Comprehensive metabolic panel; Future    HDL cholesterol; Future    LDL cholesterol, direct; Future    Cholesterol, total    Comprehensive metabolic panel    HDL cholesterol    LDL cholesterol, direct    Orders:  -     Cholesterol, total; Future; Expected date: 04/29/2025  -     Comprehensive metabolic panel; Future; Expected date: 04/29/2025  -     HDL cholesterol; Future; Expected date: 04/29/2025  -     LDL cholesterol, direct; Future; Expected date: 04/29/2025  -     Cholesterol, total  -     Comprehensive metabolic panel  -     HDL cholesterol  -     LDL cholesterol, direct  4. Pain of left hip  Assessment & Plan:  Occasionally has some symptoms, especially with laying down.  When she is up and walking around, does not seem to cause as many problems.  If there is worsening of her symptoms, would recommend that she obtain the MRI that was ordered before.         5. Lumbar disc disease  Assessment & Plan:  Some of her hip pain could be related to lumbar disc disease.  Currently, it does not bother her too much.  If she has increased symptoms or problems, obtain the MRI that was ordered previously.         6. Hypokalemia  Comments:  Potassium level was low on her most recent blood work.  Recommend recheck.  Ordered previously.      COVID 19 Instructions    Helena Rueda was advised to limit contact with others to essential tasks such as getting food, medications, and medical care.    Proper handwashing reviewed, and Hand sanitzer  when washing is not available.    If the patient develops symptoms of COVID 19, the patient should call the office as soon as possible.    It is strongly recommended that Flu Vaccinations be obtained.      Virtual Visits:  Consuelo: This works on smart phones (any phone with Internet browsing capability).  You should get a text message when the provider is ready to see you.  Click on the link in the text message, and the call should start.  You will need to type in your name, and allow camera and microphone access.  This is HIPPA compliant, and secure.      If you have not already done so, get immunized to COVID 19.      We are committed to getting you vaccinated as soon as possible and will be closely following [facility] and [facility] guidelines as they are released and revised.  Please refer to our COVID-19 vaccine webpage for the most up to date information on the vaccine and our distribution efforts.    This site will also have the most up to date recommendations for COVID booster vaccine.    https://www.[facility].org/covid-19/protect-yourself/covid-19-vaccine    Call 1-264-[phone] ([phone]), option 7    You can also visit https://www.vaccines.gov/ to find vaccines in your area.    OUR LOCATION:    [facility]  [address] [place] [address], [address]  [place], [place], [address] [phone]  Fax: [phone]    Lab services, Rheumatology, and OB/GYN are at this location as well.

## 2024-10-30 NOTE — RESULT ENCOUNTER NOTE
Tests were not normal, and should follow at  your scheduled Office visit. Please call about this, if [employer] message is not available or not read by patient.

## 2024-11-04 ENCOUNTER — TELEPHONE (OUTPATIENT)
Dept: FAMILY MEDICINE CLINIC | Facility: CLINIC | Age: 69
End: 2024-11-04

## 2024-11-04 NOTE — TELEPHONE ENCOUNTER
----- Message from Jonatna Newsome MD sent at 10/30/2024  4:56 PM EDT -----  Tests were not normal, and should follow at  your scheduled Office visit. Please call about this, if [employer] message is not available or not read by patient.

## 2024-12-31 ENCOUNTER — APPOINTMENT (OUTPATIENT)
Dept: LAB | Facility: MEDICAL CENTER | Age: 69
End: 2024-12-31
Payer: COMMERCIAL

## 2024-12-31 DIAGNOSIS — R50.9 HYPERTHERMIA-INDUCED DEFECT: ICD-10-CM

## 2024-12-31 DIAGNOSIS — R31.9 HEMATURIA SYNDROME: ICD-10-CM

## 2024-12-31 LAB
ALBUMIN SERPL BCG-MCNC: 3.9 G/DL (ref 3.5–5)
ALP SERPL-CCNC: 68 U/L (ref 34–104)
ALT SERPL W P-5'-P-CCNC: 26 U/L (ref 7–52)
ANION GAP SERPL CALCULATED.3IONS-SCNC: 5 MMOL/L (ref 4–13)
AST SERPL W P-5'-P-CCNC: 16 U/L (ref 13–39)
BACTERIA UR QL AUTO: NORMAL /HPF
BASOPHILS # BLD AUTO: 0.01 THOUSANDS/ΜL (ref 0–0.1)
BASOPHILS NFR BLD AUTO: 0 % (ref 0–1)
BILIRUB SERPL-MCNC: 0.34 MG/DL (ref 0.2–1)
BILIRUB UR QL STRIP: NEGATIVE
BUN SERPL-MCNC: 17 MG/DL (ref 5–25)
CALCIUM SERPL-MCNC: 9.9 MG/DL (ref 8.4–10.2)
CHLORIDE SERPL-SCNC: 107 MMOL/L (ref 96–108)
CLARITY UR: NORMAL
CO2 SERPL-SCNC: 31 MMOL/L (ref 21–32)
COLOR UR: NORMAL
CREAT SERPL-MCNC: 0.72 MG/DL (ref 0.6–1.3)
EOSINOPHIL # BLD AUTO: 0.25 THOUSAND/ΜL (ref 0–0.61)
EOSINOPHIL NFR BLD AUTO: 4 % (ref 0–6)
ERYTHROCYTE [DISTWIDTH] IN BLOOD BY AUTOMATED COUNT: 13.4 % (ref 11.6–15.1)
GFR SERPL CREATININE-BSD FRML MDRD: 85 ML/MIN/1.73SQ M
GLUCOSE P FAST SERPL-MCNC: 108 MG/DL (ref 65–99)
GLUCOSE UR STRIP-MCNC: NEGATIVE MG/DL
HCT VFR BLD AUTO: 42.8 % (ref 34.8–46.1)
HGB BLD-MCNC: 13.3 G/DL (ref 11.5–15.4)
HGB UR QL STRIP.AUTO: NEGATIVE
IMM GRANULOCYTES # BLD AUTO: 0.03 THOUSAND/UL (ref 0–0.2)
IMM GRANULOCYTES NFR BLD AUTO: 1 % (ref 0–2)
KETONES UR STRIP-MCNC: NEGATIVE MG/DL
LEUKOCYTE ESTERASE UR QL STRIP: NEGATIVE
LYMPHOCYTES # BLD AUTO: 2.18 THOUSANDS/ΜL (ref 0.6–4.47)
LYMPHOCYTES NFR BLD AUTO: 38 % (ref 14–44)
MCH RBC QN AUTO: 27.5 PG (ref 26.8–34.3)
MCHC RBC AUTO-ENTMCNC: 31.1 G/DL (ref 31.4–37.4)
MCV RBC AUTO: 89 FL (ref 82–98)
MONOCYTES # BLD AUTO: 0.29 THOUSAND/ΜL (ref 0.17–1.22)
MONOCYTES NFR BLD AUTO: 5 % (ref 4–12)
NEUTROPHILS # BLD AUTO: 2.92 THOUSANDS/ΜL (ref 1.85–7.62)
NEUTS SEG NFR BLD AUTO: 52 % (ref 43–75)
NITRITE UR QL STRIP: NEGATIVE
NON-SQ EPI CELLS URNS QL MICRO: NORMAL /HPF
NRBC BLD AUTO-RTO: 0 /100 WBCS
PH UR STRIP.AUTO: 7 [PH]
PLATELET # BLD AUTO: 296 THOUSANDS/UL (ref 149–390)
PMV BLD AUTO: 9 FL (ref 8.9–12.7)
POTASSIUM SERPL-SCNC: 3.5 MMOL/L (ref 3.5–5.3)
PROT SERPL-MCNC: 6.3 G/DL (ref 6.4–8.4)
PROT UR STRIP-MCNC: NEGATIVE MG/DL
RBC # BLD AUTO: 4.83 MILLION/UL (ref 3.81–5.12)
RBC #/AREA URNS AUTO: NORMAL /HPF
SODIUM SERPL-SCNC: 143 MMOL/L (ref 135–147)
SP GR UR STRIP.AUTO: 1.01 (ref 1–1.03)
UROBILINOGEN UR STRIP-ACNC: <2 MG/DL
WBC # BLD AUTO: 5.68 THOUSAND/UL (ref 4.31–10.16)
WBC #/AREA URNS AUTO: NORMAL /HPF

## 2024-12-31 PROCEDURE — 81001 URINALYSIS AUTO W/SCOPE: CPT

## 2024-12-31 PROCEDURE — 80053 COMPREHEN METABOLIC PANEL: CPT

## 2024-12-31 PROCEDURE — 36415 COLL VENOUS BLD VENIPUNCTURE: CPT

## 2024-12-31 PROCEDURE — 85025 COMPLETE CBC W/AUTO DIFF WBC: CPT

## 2025-01-27 ENCOUNTER — OFFICE VISIT (OUTPATIENT)
Dept: FAMILY MEDICINE CLINIC | Facility: CLINIC | Age: 70
End: 2025-01-27
Payer: COMMERCIAL

## 2025-01-27 VITALS
WEIGHT: 121.8 LBS | BODY MASS INDEX: 23 KG/M2 | SYSTOLIC BLOOD PRESSURE: 120 MMHG | DIASTOLIC BLOOD PRESSURE: 62 MMHG | HEIGHT: 61 IN | HEART RATE: 97 BPM | OXYGEN SATURATION: 96 % | TEMPERATURE: 98.8 F

## 2025-01-27 DIAGNOSIS — J06.9 UPPER RESPIRATORY TRACT INFECTION, UNSPECIFIED TYPE: Primary | ICD-10-CM

## 2025-01-27 LAB
SARS-COV-2 AG UPPER RESP QL IA: NEGATIVE
SL AMB POCT RAPID FLU A: NORMAL
SL AMB POCT RAPID FLU B: NEGATIVE
VALID CONTROL: NORMAL

## 2025-01-27 PROCEDURE — 87804 INFLUENZA ASSAY W/OPTIC: CPT | Performed by: NURSE PRACTITIONER

## 2025-01-27 PROCEDURE — G2211 COMPLEX E/M VISIT ADD ON: HCPCS | Performed by: NURSE PRACTITIONER

## 2025-01-27 PROCEDURE — 99214 OFFICE O/P EST MOD 30 MIN: CPT | Performed by: NURSE PRACTITIONER

## 2025-01-27 PROCEDURE — 87811 SARS-COV-2 COVID19 W/OPTIC: CPT | Performed by: NURSE PRACTITIONER

## 2025-01-27 RX ORDER — AZITHROMYCIN 250 MG/1
TABLET, FILM COATED ORAL
Qty: 6 TABLET | Refills: 0 | Status: SHIPPED | OUTPATIENT
Start: 2025-01-27 | End: 2025-02-01

## 2025-01-27 NOTE — LETTER
January 27, 2025     Patient: Helena Rueda  YOB: 1955  Date of Visit: 1/27/2025      To Whom it May Concern:    Helena Rueda is under my professional care. Helena was seen in my office on 1/27/2025. Helena may return to work on 1/29/2025 . Please excuse starting 1/23/2025.    If you have any questions or concerns, please don't hesitate to call.         Sincerely,          TRACI Clemente        CC: No Recipients

## 2025-01-27 NOTE — PATIENT INSTRUCTIONS
Problem List Items Addressed This Visit    None  Visit Diagnoses         Upper respiratory tract infection, unspecified type    -  Primary    Relevant Medications    azithromycin (Zithromax) 250 mg tablet    Other Relevant Orders    POCT Rapid Covid Ag (Completed)    POCT rapid flu A and B (Completed)

## 2025-01-27 NOTE — PROGRESS NOTES
"Name: Helena Rueda      : 1955      MRN: [id number]  Encounter Provider: TARCI Clemente  Encounter Date: 2025   Encounter department: [facility]  :  Assessment & Plan  Upper respiratory tract infection, unspecified type      Covid and flu negative  Zpack ordered   Can take delsym otc and continue with motrin  Also recommend warm salt water gargles   Orders:  •  POCT Rapid Covid Ag  •  POCT rapid flu A and B  •  azithromycin (Zithromax) 250 mg tablet; Take 2 tablets (500 mg total) by mouth daily for 1 day, THEN 1 tablet (250 mg total) daily for 4 days.           History of Present Illness   Patient states symptoms started last Wednesday night  Saturday she felt better but her symptoms worse again today   She did not take a temperature at home but started with fever on Thursday     Has been taking motrin   Her fiance is sick but started his symptoms after       Review of Systems   Constitutional:  Positive for chills, diaphoresis, fatigue and fever.   HENT:  Positive for postnasal drip, rhinorrhea and sore throat (slight). Negative for congestion and ear pain.    Respiratory:  Positive for cough (productive green) and chest tightness (slight).    Cardiovascular:  Negative for chest pain and palpitations.   Gastrointestinal:  Positive for vomiting. Negative for abdominal pain, diarrhea and nausea.   Musculoskeletal:  Positive for myalgias.   Neurological:  Positive for weakness and headaches.       Objective   /62 (BP Location: Left arm, Patient Position: Sitting, Cuff Size: Standard)   Pulse 97   Temp 98.8 °F (37.1 °C) (Oral)   Ht 5' 1\" (1.549 m)   Wt 55.2 kg (121 lb 12.8 oz)   SpO2 96%   BMI 23.01 kg/m²      Physical Exam  Vitals and nursing note reviewed.   Constitutional:       General: She is not in acute distress.     Appearance: Normal appearance. She is ill-appearing. She is not diaphoretic.   HENT:      Head: Normocephalic and atraumatic.      Right Ear: " Tympanic membrane and external ear normal.      Left Ear: Tympanic membrane and external ear normal.      Nose: Mucosal edema and rhinorrhea (left side) present. Rhinorrhea is purulent and bloody.      Right Sinus: Maxillary sinus tenderness present.      Mouth/Throat:      Lips: Pink.      Mouth: Mucous membranes are moist.      Pharynx: Posterior oropharyngeal erythema and postnasal drip present. No pharyngeal swelling.   Eyes:      Extraocular Movements: Extraocular movements intact.      Conjunctiva/sclera: Conjunctivae normal.   Cardiovascular:      Rate and Rhythm: Normal rate and regular rhythm.      Heart sounds: Normal heart sounds, S1 normal and S2 normal. No murmur heard.  Pulmonary:      Effort: Pulmonary effort is normal.      Breath sounds: Normal breath sounds. No wheezing or rhonchi.   Neurological:      Mental Status: She is alert and oriented to person, place, and time.   Psychiatric:         Mood and Affect: Mood normal.         Behavior: Behavior normal.         Thought Content: Thought content normal.         Judgment: Judgment normal.

## 2025-03-03 ENCOUNTER — APPOINTMENT (OUTPATIENT)
Dept: LAB | Facility: MEDICAL CENTER | Age: 70
End: 2025-03-03
Payer: COMMERCIAL

## 2025-03-03 DIAGNOSIS — N02.9 IDIOPATHIC HEMATURIA, UNSPECIFIED WHETHER GLOMERULAR MORPHOLOGIC CHANGES PRESENT: ICD-10-CM

## 2025-03-03 LAB
BACTERIA UR QL AUTO: ABNORMAL /HPF
BILIRUB UR QL STRIP: NEGATIVE
CLARITY UR: CLEAR
COLOR UR: ABNORMAL
GLUCOSE UR STRIP-MCNC: NEGATIVE MG/DL
HGB UR QL STRIP.AUTO: NEGATIVE
KETONES UR STRIP-MCNC: NEGATIVE MG/DL
LEUKOCYTE ESTERASE UR QL STRIP: NEGATIVE
NITRITE UR QL STRIP: POSITIVE
NON-SQ EPI CELLS URNS QL MICRO: ABNORMAL /HPF
PH UR STRIP.AUTO: 6 [PH]
PROT UR STRIP-MCNC: NEGATIVE MG/DL
RBC #/AREA URNS AUTO: ABNORMAL /HPF
SP GR UR STRIP.AUTO: 1.02 (ref 1–1.03)
UROBILINOGEN UR STRIP-ACNC: <2 MG/DL
WBC #/AREA URNS AUTO: ABNORMAL /HPF

## 2025-03-03 PROCEDURE — 81001 URINALYSIS AUTO W/SCOPE: CPT

## 2025-03-27 ENCOUNTER — APPOINTMENT (OUTPATIENT)
Dept: LAB | Facility: MEDICAL CENTER | Age: 70
End: 2025-03-27
Payer: COMMERCIAL

## 2025-03-27 DIAGNOSIS — R73.9 BLOOD GLUCOSE ELEVATED: ICD-10-CM

## 2025-03-27 DIAGNOSIS — E78.00 PURE HYPERCHOLESTEROLEMIA: ICD-10-CM

## 2025-03-27 LAB
CHOLEST SERPL-MCNC: 205 MG/DL (ref ?–200)
EST. AVERAGE GLUCOSE BLD GHB EST-MCNC: 117 MG/DL
HBA1C MFR BLD: 5.7 %
HDLC SERPL-MCNC: 48 MG/DL
LDLC SERPL CALC-MCNC: 118 MG/DL (ref 0–100)
NONHDLC SERPL-MCNC: 157 MG/DL
TRIGL SERPL-MCNC: 195 MG/DL (ref ?–150)

## 2025-03-27 PROCEDURE — 80061 LIPID PANEL: CPT

## 2025-03-27 PROCEDURE — 83036 HEMOGLOBIN GLYCOSYLATED A1C: CPT

## 2025-03-27 PROCEDURE — 36415 COLL VENOUS BLD VENIPUNCTURE: CPT

## 2025-04-21 ENCOUNTER — RA CDI HCC (OUTPATIENT)
Dept: OTHER | Facility: HOSPITAL | Age: 70
End: 2025-04-21

## 2025-04-28 ENCOUNTER — OFFICE VISIT (OUTPATIENT)
Dept: FAMILY MEDICINE CLINIC | Facility: CLINIC | Age: 70
End: 2025-04-28
Payer: COMMERCIAL

## 2025-04-28 VITALS
WEIGHT: 125 LBS | BODY MASS INDEX: 23.6 KG/M2 | DIASTOLIC BLOOD PRESSURE: 60 MMHG | SYSTOLIC BLOOD PRESSURE: 130 MMHG | OXYGEN SATURATION: 99 % | HEIGHT: 61 IN | HEART RATE: 82 BPM

## 2025-04-28 DIAGNOSIS — Z23 NEED FOR INFLUENZA VACCINATION: ICD-10-CM

## 2025-04-28 DIAGNOSIS — E78.2 MIXED HYPERLIPIDEMIA: Primary | ICD-10-CM

## 2025-04-28 DIAGNOSIS — E87.6 HYPOKALEMIA: ICD-10-CM

## 2025-04-28 DIAGNOSIS — Z23 NEED FOR VACCINATION FOR STREP PNEUMONIAE: ICD-10-CM

## 2025-04-28 DIAGNOSIS — Z23 NEED FOR COVID-19 VACCINE: ICD-10-CM

## 2025-04-28 DIAGNOSIS — Z29.11 NEED FOR RSV VACCINATION: ICD-10-CM

## 2025-04-28 DIAGNOSIS — K21.9 GASTROESOPHAGEAL REFLUX DISEASE, UNSPECIFIED WHETHER ESOPHAGITIS PRESENT: ICD-10-CM

## 2025-04-28 DIAGNOSIS — M25.552 PAIN OF LEFT HIP: ICD-10-CM

## 2025-04-28 DIAGNOSIS — Z23 NEED FOR ZOSTER VACCINE: ICD-10-CM

## 2025-04-28 DIAGNOSIS — R07.89 OTHER CHEST PAIN: ICD-10-CM

## 2025-04-28 PROCEDURE — G2211 COMPLEX E/M VISIT ADD ON: HCPCS | Performed by: FAMILY MEDICINE

## 2025-04-28 PROCEDURE — 99214 OFFICE O/P EST MOD 30 MIN: CPT | Performed by: FAMILY MEDICINE

## 2025-04-28 RX ORDER — OMEPRAZOLE 20 MG/1
20 TABLET, DELAYED RELEASE ORAL DAILY
Qty: 90 TABLET | Refills: 1 | Status: SHIPPED | OUTPATIENT
Start: 2025-04-28

## 2025-04-28 RX ORDER — ATORVASTATIN CALCIUM 10 MG/1
10 TABLET, FILM COATED ORAL DAILY
Qty: 90 TABLET | Refills: 3 | Status: SHIPPED | OUTPATIENT
Start: 2025-04-28

## 2025-04-28 NOTE — PROGRESS NOTES
Name: Helena Rueda      : 1955      MRN: [id number]  Encounter Provider: Jonatan Newsome MD  Encounter Date: 2025   Encounter department: [facility]  :  Assessment & Plan  Mixed hyperlipidemia  Patient was on atorvastatin before.  I would recommend that she start the atorvastatin again.  Would be 10 mg 1 pill once a day.  Total cholesterol is quite a bit elevated as is the LDL.  Orders:  •  atorvastatin (LIPITOR) 10 mg tablet; Take 1 tablet (10 mg total) by mouth daily  •  Cholesterol, total; Future  •  Comprehensive metabolic panel; Future  •  LDL cholesterol, direct; Future  •  HDL cholesterol; Future    Hypokalemia  Noted before.  Labs recently (December) were done, and was normal.  Orders:  •  Comprehensive metabolic panel; Future    Gastroesophageal reflux disease, unspecified whether esophagitis present  Currently using omeprazole 3 times a week, but does have a significant amount of breakthrough with that.  I would recommend that she use it every day.    Orders:  •  omeprazole (PriLOSEC OTC) 20 MG tablet; Take 1 tablet (20 mg total) by mouth daily    Pain of left hip  Currently not having any pains.       Need for COVID-19 vaccine         Need for influenza vaccination         Need for zoster vaccine         Need for vaccination for Strep pneumoniae         Need for RSV vaccination         Other chest pain            Preventive health issues were discussed with patient, and age appropriate screening tests were ordered as noted in patient's After Visit Summary. Personalized health advice and appropriate referrals for health education or preventive services given if needed, as noted in patient's After Visit Summary.      1. Mixed hyperlipidemia  Assessment & Plan:  Patient was on atorvastatin before.  I would recommend that she start the atorvastatin again.  Would be 10 mg 1 pill once a day.  Total cholesterol is quite a bit elevated as is the LDL.  Orders:  •   atorvastatin (LIPITOR) 10 mg tablet; Take 1 tablet (10 mg total) by mouth daily  •  Cholesterol, total; Future  •  Comprehensive metabolic panel; Future  •  LDL cholesterol, direct; Future  •  HDL cholesterol; Future    Orders:  -     atorvastatin (LIPITOR) 10 mg tablet; Take 1 tablet (10 mg total) by mouth daily  -     Cholesterol, total; Future; Expected date: 10/28/2025  -     Comprehensive metabolic panel; Future; Expected date: 10/28/2025  -     LDL cholesterol, direct; Future; Expected date: 10/28/2025  -     HDL cholesterol; Future; Expected date: 10/28/2025  2. Hypokalemia  -     Comprehensive metabolic panel; Future; Expected date: 10/28/2025  3. Gastroesophageal reflux disease, unspecified whether esophagitis present  Assessment & Plan:  Currently using omeprazole 3 times a week, but does have a significant amount of breakthrough with that.  I would recommend that she use it every day.    Orders:  •  omeprazole (PriLOSEC OTC) 20 MG tablet; Take 1 tablet (20 mg total) by mouth daily    Orders:  -     omeprazole (PriLOSEC OTC) 20 MG tablet; Take 1 tablet (20 mg total) by mouth daily  4. Pain of left hip  Assessment & Plan:  Currently not having any pains.       5. Need for COVID-19 vaccine  Comments:  Consider COVID per CDC guidelines for 24 season.  6. Need for influenza vaccination  Comments:  Recommend yearly influenza vaccine.  This should be done in the fall.  7. Need for zoster vaccine  Comments:  Consider Shingrix series.  Would need at local pharmacy.  8. Need for vaccination for Strep pneumoniae  Comments:  Recommend strep pneumonia vaccine, Prevnar 20.  Would get once in her lifetime.  9. Need for RSV vaccination  Comments:  Consider RSV vaccine at local pharmacy.  10. Other chest pain  Comments:  dx while at outside PCP.  Referred to stress test, but not completed yet.  No current symptoms. Reeval next OV.      Chief Complaint   Patient presents with   • Medicare Wellness Visit         History of  Present Illness     Patient is here to follow-up on multiple issues.  Also for AWV.             Patient Care Team:  Jonatan Newsome MD as PCP - General (Family Medicine)    Review of Systems  Medical History Reviewed by provider this encounter:  Tobacco  Allergies  Meds  Problems  Med Hx  Surg Hx  Fam Hx       Annual Wellness Visit Questionnaire   Helena is here for her Subsequent Wellness visit.     Health Risk Assessment:   Patient rates overall health as very good. Patient feels that their physical health rating is same. Patient is very satisfied with their life. Eyesight was rated as slightly worse. Hearing was rated as same. Patient feels that their emotional and mental health rating is same. Patients states they are sometimes angry. Patient states they are sometimes unusually tired/fatigued. Pain experienced in the last 7 days has been none. Patient states that she has experienced no weight loss or gain in last 6 months.     Depression Screening:   PHQ-2 Score: 0      Fall Risk Screening:   In the past year, patient has experienced: history of falling in past year    Number of falls: 1  Injured during fall?: Yes    Feels unsteady when standing or walking?: No    Worried about falling?: No      Urinary Incontinence Screening:   Patient has not leaked urine accidently in the last six months.     Home Safety:  Patient does not have trouble with stairs inside or outside of their home. Patient has working smoke alarms and has working carbon monoxide detector. Home safety hazards include: none.     Nutrition:   Current diet is Regular.     Medications:   Patient is currently taking over-the-counter supplements. OTC medications include: see medication list. Patient is able to manage medications.     Activities of Daily Living (ADLs)/Instrumental Activities of Daily Living (IADLs):   Walk and transfer into and out of bed and chair?: Yes  Dress and groom yourself?: Yes    Bathe or shower yourself?: Yes     Feed yourself? Yes  Do your laundry/housekeeping?: Yes  Manage your money, pay your bills and track your expenses?: Yes  Make your own meals?: Yes    Do your own shopping?: Yes    Previous Hospitalizations:   Any hospitalizations or ED visits within the last 12 months?: No      Advance Care Planning:   Living will: No    Durable POA for healthcare: No    Advanced directive: No    Advanced directive counseling given: Yes    ACP document given: Yes      Cognitive Screening:   Provider or family/friend/caregiver concerned regarding cognition?: No    Preventive Screenings      Cardiovascular Screening:    General: Screening Not Indicated and History Lipid Disorder      Diabetes Screening:     General: Screening Current      Colorectal Cancer Screening:     General: Screening Current      Breast Cancer Screening:     General: Screening Current      Cervical Cancer Screening:    General: Screening Not Indicated      Osteoporosis Screening:    General: Risks and Benefits Discussed      Abdominal Aortic Aneurysm (AAA) Screening:        General: Screening Not Indicated      Lung Cancer Screening:     General: Screening Not Indicated      Hepatitis C Screening:    General: Screening Not Indicated    Immunizations:  - Immunizations due: Influenza, Prevnar 20 and Zoster (Shingrix)    Screening, Brief Intervention, and Referral to Treatment (SBIRT)     Screening  Typical number of drinks in a day: 0  Typical number of drinks in a week: 0  Interpretation: Low risk drinking behavior.    AUDIT-C Screenin) How often did you have a drink containing alcohol in the past year? monthly or less  3) How often did you have 6 or more drinks on one occasion in the past year? never    Single Item Drug Screening:  How often have you used an illegal drug (including marijuana) or a prescription medication for non-medical reasons in the past year? never    Single Item Drug Screen Score: 0  Interpretation: Negative screen for possible drug  "use disorder    Social Drivers of Health     Financial Resource Strain: Low Risk  (4/24/2023)    Overall Financial Resource Strain (CARDIA)    • Difficulty of Paying Living Expenses: Not hard at all   Food Insecurity: No Food Insecurity (4/28/2025)    Hunger Vital Sign    • Worried About Running Out of Food in the Last Year: Never true    • Ran Out of Food in the Last Year: Never true   Transportation Needs: No Transportation Needs (4/28/2025)    PRAPARE - Transportation    • Lack of Transportation (Medical): No    • Lack of Transportation (Non-Medical): No   Housing Stability: Low Risk  (4/28/2025)    Housing Stability Vital Sign    • Unable to Pay for Housing in the Last Year: No    • Number of Times Moved in the Last Year: 0    • Homeless in the Last Year: No   Utilities: Not At Risk (4/28/2025)    [facility] Utilities    • Threatened with loss of utilities: No     No results found.    A1c 5.7.  Total cholesterol 205, , HDL 48, triglycerides 195.    Objective   /60 (BP Location: Right arm, Cuff Size: Standard)   Pulse 82   Ht 5' 1\" (1.549 m)   Wt 56.7 kg (125 lb)   SpO2 99%   BMI 23.62 kg/m²     Physical Exam  Vitals and nursing note reviewed.   Constitutional:       Appearance: Normal appearance. She is well-developed.   HENT:      Head: Normocephalic and atraumatic.   Cardiovascular:      Rate and Rhythm: Normal rate and regular rhythm.      Pulses:           Carotid pulses are 2+ on the right side and 2+ on the left side.     Heart sounds: Normal heart sounds.   Pulmonary:      Effort: Pulmonary effort is normal.      Breath sounds: Normal breath sounds. No wheezing or rales.   Chest:      Chest wall: No tenderness.   Neurological:      Mental Status: She is alert.         "

## 2025-04-28 NOTE — ASSESSMENT & PLAN NOTE
Patient was on atorvastatin before.  I would recommend that she start the atorvastatin again.  Would be 10 mg 1 pill once a day.  Total cholesterol is quite a bit elevated as is the LDL.  Orders:  •  atorvastatin (LIPITOR) 10 mg tablet; Take 1 tablet (10 mg total) by mouth daily  •  Cholesterol, total; Future  •  Comprehensive metabolic panel; Future  •  LDL cholesterol, direct; Future  •  HDL cholesterol; Future

## 2025-04-28 NOTE — ASSESSMENT & PLAN NOTE
Currently using omeprazole 3 times a week, but does have a significant amount of breakthrough with that.  I would recommend that she use it every day.    Orders:  •  omeprazole (PriLOSEC OTC) 20 MG tablet; Take 1 tablet (20 mg total) by mouth daily

## 2025-04-28 NOTE — PATIENT INSTRUCTIONS
Medicare Preventive Visit Patient Instructions  Thank you for completing your Welcome to Medicare Visit or Medicare Annual Wellness Visit today. Your next wellness visit will be due in one year (4/29/2026).  The screening/preventive services that you may require over the next 5-10 years are detailed below. Some tests may not apply to you based off risk factors and/or age. Screening tests ordered at today's visit but not completed yet may show as past due. Also, please note that scanned in results may not display below.  Preventive Screenings:  Service Recommendations Previous Testing/Comments   Colorectal Cancer Screening  * Colonoscopy    * Fecal Occult Blood Test (FOBT)/Fecal Immunochemical Test (FIT)  * Fecal DNA/Cologuard Test  * Flexible Sigmoidoscopy Age: 45-75 years old   Colonoscopy: every 10 years (may be performed more frequently if at higher risk)  OR  FOBT/FIT: every 1 year  OR  Cologuard: every 3 years  OR  Sigmoidoscopy: every 5 years  Screening may be recommended earlier than age 45 if at higher risk for colorectal cancer. Also, an individualized decision between you and your healthcare provider will decide whether screening between the ages of 76-85 would be appropriate. Colonoscopy: Not on file  FOBT/FIT: Not on file  Cologuard: 07/02/2024  Sigmoidoscopy: Not on file    Screening Current     Breast Cancer Screening Age: 40+ years old  Frequency: every 1-2 years  Not required if history of left and right mastectomy Mammogram: 06/25/2024    Screening Current   Cervical Cancer Screening Between the ages of 21-29, pap smear recommended once every 3 years.   Between the ages of 30-65, can perform pap smear with HPV co-testing every 5 years.   Recommendations may differ for women with a history of total hysterectomy, cervical cancer, or abnormal pap smears in past. Pap Smear: Not on file    Screening Not Indicated   Hepatitis C Screening Once for adults born between 1945 and 1965  More frequently in  patients at high risk for Hepatitis C Hep C Antibody: Not on file    Screening Not Indicated   Diabetes Screening 1-2 times per year if you're at risk for diabetes or have pre-diabetes Fasting glucose: 108 mg/dL (12/31/2024)  A1C: 5.7 % (3/27/2025)  Screening Current   Cholesterol Screening Once every 5 years if you don't have a lipid disorder. May order more often based on risk factors. Lipid panel: 03/27/2025    Screening Not Indicated  History Lipid Disorder     Other Preventive Screenings Covered by Medicare:  Abdominal Aortic Aneurysm (AAA) Screening: covered once if your at risk. You're considered to be at risk if you have a family history of AAA.  Lung Cancer Screening: covers low dose CT scan once per year if you meet all of the following conditions: (1) Age 55-77; (2) No signs or symptoms of lung cancer; (3) Current smoker or have quit smoking within the last 15 years; (4) You have a tobacco smoking history of at least 20 pack years (packs per day multiplied by number of years you smoked); (5) You get a written order from a healthcare provider.  Glaucoma Screening: covered annually if you're considered high risk: (1) You have diabetes OR (2) Family history of glaucoma OR (3)  aged 50 and older OR (4)  American aged 65 and older  Osteoporosis Screening: covered every 2 years if you meet one of the following conditions: (1) You're estrogen deficient and at risk for osteoporosis based off medical history and other findings; (2) Have a vertebral abnormality; (3) On glucocorticoid therapy for more than 3 months; (4) Have primary hyperparathyroidism; (5) On osteoporosis medications and need to assess response to drug therapy.   Last bone density test (DXA Scan): 02/09/2022.  HIV Screening: covered annually if you're between the age of 15-65. Also covered annually if you are younger than 15 and older than 65 with risk factors for HIV infection. For pregnant patients, it is covered up to 3  times per pregnancy.    Immunizations:  Immunization Recommendations   Influenza Vaccine Annual influenza vaccination during flu season is recommended for all persons aged >= 6 months who do not have contraindications   Pneumococcal Vaccine   * Pneumococcal conjugate vaccine = PCV13 (Prevnar 13), PCV15 (Vaxneuvance), PCV20 (Prevnar 20)  * Pneumococcal polysaccharide vaccine = PPSV23 (Pneumovax) Adults 19-65 yo with certain risk factors or if 65+ yo  If never received any pneumonia vaccine: recommend Prevnar 20 (PCV20)  Give PCV20 if previously received 1 dose of PCV13 or PPSV23   Hepatitis B Vaccine 3 dose series if at intermediate or high risk (ex: diabetes, end stage renal disease, liver disease)   Respiratory syncytial virus (RSV) Vaccine - COVERED BY MEDICARE PART D  * RSVPreF3 (Arexvy) CDC recommends that adults 60 years of age and older may receive a single dose of RSV vaccine using shared clinical decision-making (SCDM)   Tetanus (Td) Vaccine - COST NOT COVERED BY MEDICARE PART B Following completion of primary series, a booster dose should be given every 10 years to maintain immunity against tetanus. Td may also be given as tetanus wound prophylaxis.   Tdap Vaccine - COST NOT COVERED BY MEDICARE PART B Recommended at least once for all adults. For pregnant patients, recommended with each pregnancy.   Shingles Vaccine (Shingrix) - COST NOT COVERED BY MEDICARE PART B  2 shot series recommended in those 19 years and older who have or will have weakened immune systems or those 50 years and older     Health Maintenance Due:      Topic Date Due    Hepatitis C Screening  Never done    Breast Cancer Screening: Mammogram  06/25/2025    Colorectal Cancer Screening  07/02/2027     Immunizations Due:      Topic Date Due    Pneumococcal Vaccine: 65+ Years (1 of 1 - PCV) Never done    Influenza Vaccine (1) Never done    COVID-19 Vaccine (5 - 2024-25 season) 09/01/2024     Advance Directives   What are advance directives?   Advance directives are legal documents that state your wishes and plans for medical care. These plans are made ahead of time in case you lose your ability to make decisions for yourself. Advance directives can apply to any medical decision, such as the treatments you want, and if you want to donate organs.   What are the types of advance directives?  There are many types of advance directives, and each state has rules about how to use them. You may choose a combination of any of the following:  Living will:  This is a written record of the treatment you want. You can also choose which treatments you do not want, which to limit, and which to stop at a certain time. This includes surgery, medicine, IV fluid, and tube feedings.   Durable power of  for healthcare (DPAHC):  This is a written record that states who you want to make healthcare choices for you when you are unable to make them for yourself. This person, called a proxy, is usually a family member or a friend. You may choose more than 1 proxy.  Do not resuscitate (DNR) order:  A DNR order is used in case your heart stops beating or you stop breathing. It is a request not to have certain forms of treatment, such as CPR. A DNR order may be included in other types of advance directives.  Medical directive:  This covers the care that you want if you are in a coma, near death, or unable to make decisions for yourself. You can list the treatments you want for each condition. Treatment may include pain medicine, surgery, blood transfusions, dialysis, IV or tube feedings, and a ventilator (breathing machine).  Values history:  This document has questions about your views, beliefs, and how you feel and think about life. This information can help others choose the care that you would choose.  Why are advance directives important?  An advance directive helps you control your care. Although spoken wishes may be used, it is better to have your wishes written down.  Spoken wishes can be misunderstood, or not followed. Treatments may be given even if you do not want them. An advance directive may make it easier for your family to make difficult choices about your care.   Fall Prevention    Fall prevention  includes ways to make your home and other areas safer. It also includes ways you can move more carefully to prevent a fall. Health conditions that cause changes in your blood pressure, vision, or muscle strength and coordination may increase your risk for falls. Medicines may also increase your risk for falls if they make you dizzy, weak, or sleepy.   Fall prevention tips:   Stand or sit up slowly.    Use assistive devices as directed.    Wear shoes that fit well and have soles that .    Wear a personal alarm.    Stay active.    Manage your medical conditions.    Home Safety Tips:  Add items to prevent falls in the bathroom.    Keep paths clear.    Install bright lights in your home.    Keep items you use often on shelves within reach.    Paint or place reflective tape on the edges of your stairs.     © Copyright [employer] 2018 Information is for End User's use only and may not be sold, redistributed or otherwise used for commercial purposes. All illustrations and images included in CareNotes® are the copyrighted property of [employer], [employer]. or [employer]  1. Mixed hyperlipidemia  Assessment & Plan:  Patient was on atorvastatin before.  I would recommend that she start the atorvastatin again.  Would be 10 mg 1 pill once a day.  Total cholesterol is quite a bit elevated as is the LDL.  Orders:    atorvastatin (LIPITOR) 10 mg tablet; Take 1 tablet (10 mg total) by mouth daily    Cholesterol, total; Future    Comprehensive metabolic panel; Future    LDL cholesterol, direct; Future    HDL cholesterol; Future    Orders:  -     atorvastatin (LIPITOR) 10 mg tablet; Take 1 tablet (10 mg total) by mouth daily  -     Cholesterol, total; Future; Expected date:  10/28/2025  -     Comprehensive metabolic panel; Future; Expected date: 10/28/2025  -     LDL cholesterol, direct; Future; Expected date: 10/28/2025  -     HDL cholesterol; Future; Expected date: 10/28/2025  2. Hypokalemia  -     Comprehensive metabolic panel; Future; Expected date: 10/28/2025  3. Gastroesophageal reflux disease, unspecified whether esophagitis present  Assessment & Plan:  Currently using omeprazole 3 times a week, but does have a significant amount of breakthrough with that.  I would recommend that she use it every day.    Orders:    omeprazole (PriLOSEC OTC) 20 MG tablet; Take 1 tablet (20 mg total) by mouth daily      Orders:  -     omeprazole (PriLOSEC OTC) 20 MG tablet; Take 1 tablet (20 mg total) by mouth daily  4. Pain of left hip  Assessment & Plan:  Currently not having any pains.       5. Need for COVID-19 vaccine  Comments:  Consider COVID per CDC guidelines for 24 season.  6. Need for influenza vaccination  Comments:  Recommend yearly influenza vaccine.  This should be done in the fall.  7. Need for zoster vaccine  Comments:  Consider Shingrix series.  Would need at local pharmacy.  8. Need for vaccination for Strep pneumoniae  Comments:  Recommend strep pneumonia vaccine, Prevnar 20.  Would get once in her lifetime.  9. Need for RSV vaccination  Comments:  Consider RSV vaccine at local pharmacy.  10. Other chest pain  Comments:  dx while at outside PCP.  Referred to stress test, but not completed yet.  No current symptoms. Reeval next OV.      COVID 19 Instructions    Ehlena Marybeth was advised to limit contact with others to essential tasks such as getting food, medications, and medical care.    Proper handwashing reviewed, and Hand sanitzer when washing is not available.    If the patient develops symptoms of COVID 19, the patient should call the office as soon as possible.    It is strongly recommended that Flu Vaccinations be obtained.      Virtual Visits:  Consuelo: This works on  smart phones (any phone with Internet browsing capability).  You should get a text message when the provider is ready to see you.  Click on the link in the text message, and the call should start.  You will need to type in your name, and allow camera and microphone access.  This is HIPPA compliant, and secure.      If you have not already done so, get immunized to COVID 19.      We are committed to getting you vaccinated as soon as possible and will be closely following CDC and [facility] guidelines as they are released and revised.  Please refer to our COVID-19 vaccine webpage for the most up to date information on the vaccine and our distribution efforts.    This site will also have the most up to date recommendations for COVID booster vaccine.    [email or web address]://[email or web address].[email or web address]    Call [phone]-[phone] ([phone]), option 7    You can also visit https://www.vaccines.gov/ to find vaccines in your area.    OUR LOCATION:    [facility]  [address] [place] [address], [address]  [place], [place], [address] [phone]  Fax: [phone]    Lab services, Rheumatology, and OB/GYN are at this location as well.

## 2025-07-03 ENCOUNTER — TELEPHONE (OUTPATIENT)
Age: 70
End: 2025-07-03

## 2025-07-03 NOTE — TELEPHONE ENCOUNTER
Called pt to advise current insurances is inactive, pt gave a verbal understanding and will be reaching out to her insurances as needed.     Thank you,   Atiya

## 2025-07-03 NOTE — TELEPHONE ENCOUNTER
Patient called and scheduled and appointment and says she still has the same coverage but it seems to show rejected. Can you please verify the coverage on website form verification and please advise patient if its really not active before her upcoming appointment. Thank you. Please advise [phone] if needed.

## 2025-07-07 ENCOUNTER — OFFICE VISIT (OUTPATIENT)
Dept: FAMILY MEDICINE CLINIC | Facility: CLINIC | Age: 70
End: 2025-07-07
Payer: COMMERCIAL

## 2025-07-07 VITALS
HEART RATE: 74 BPM | DIASTOLIC BLOOD PRESSURE: 60 MMHG | SYSTOLIC BLOOD PRESSURE: 130 MMHG | HEIGHT: 61 IN | BODY MASS INDEX: 23.6 KG/M2 | WEIGHT: 125 LBS | OXYGEN SATURATION: 98 %

## 2025-07-07 DIAGNOSIS — R41.0 CONFUSION: Primary | ICD-10-CM

## 2025-07-07 DIAGNOSIS — E78.2 MIXED HYPERLIPIDEMIA: ICD-10-CM

## 2025-07-07 DIAGNOSIS — Z12.31 ENCOUNTER FOR SCREENING MAMMOGRAM FOR BREAST CANCER: ICD-10-CM

## 2025-07-07 DIAGNOSIS — R41.82 ALTERED MENTAL STATUS, UNSPECIFIED ALTERED MENTAL STATUS TYPE: ICD-10-CM

## 2025-07-07 DIAGNOSIS — K21.9 GASTROESOPHAGEAL REFLUX DISEASE, UNSPECIFIED WHETHER ESOPHAGITIS PRESENT: ICD-10-CM

## 2025-07-07 PROCEDURE — 99214 OFFICE O/P EST MOD 30 MIN: CPT | Performed by: FAMILY MEDICINE

## 2025-07-07 PROCEDURE — G2211 COMPLEX E/M VISIT ADD ON: HCPCS | Performed by: FAMILY MEDICINE

## 2025-07-07 NOTE — ASSESSMENT & PLAN NOTE
Slums test was abnormal today.  17 out of 30, though I do feel that there is some error in testing for her as she is not a  English speaker.  She is originally from [place].  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  Would recommend laboratory studies, MRI, as there did seem to be a significant amount of change with this lately.  Orders:  •  CBC and differential; Future  •  Comprehensive metabolic panel; Future  •  TSH WITH REFLEX TO FREE T4; Future  •  Vitamin B12; Future  •  Folate; Future  •  RPR-Syphilis Screening (Total Syphilis IGG/IGM); Future  •  Lyme Total AB W Reflex to IGM/IGG; Future  •  MRI brain w wo contrast

## 2025-07-07 NOTE — PROGRESS NOTES
Name: Helena Rueda      : 1955      MRN: [id number]  Encounter Provider: Jonatan Newsome MD  Encounter Date: 2025   Encounter department: [facility]  :  Assessment & Plan  Confusion  Slums test was abnormal today.  17 out of 30, though I do feel that there is some error in testing for her as she is not a  English speaker.  She is originally from [place].  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  Would recommend laboratory studies, MRI, as there did seem to be a significant amount of change with this lately.  Orders:  •  CBC and differential; Future  •  Comprehensive metabolic panel; Future  •  TSH WITH REFLEX TO FREE T4; Future  •  Vitamin B12; Future  •  Folate; Future  •  RPR-Syphilis Screening (Total Syphilis IGG/IGM); Future  •  Lyme Total AB W Reflex to IGM/IGG; Future  •  MRI brain w wo contrast    Mixed hyperlipidemia  Due for labs in the future.  Currently on atorvastatin.       Gastroesophageal reflux disease, unspecified whether esophagitis present  Stable at the moment.  Currently on omeprazole.  Orders:  •  Vitamin B12; Future  •  Folate; Future    Encounter for screening mammogram for breast cancer    Orders:  •  Mammo screening bilateral w 3d and cad; Future    Altered mental status, unspecified altered mental status type    Orders:  •  Vitamin B12; Future  •  Folate; Future          1. Confusion  Assessment & Plan:  Slums test was abnormal today.  17 out of 30, though I do feel that there is some error in testing for her as she is not a  English speaker.  She is originally from [place].  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  Would recommend laboratory studies, MRI, as there did seem to be a significant amount of change with this lately.  Orders:  •  CBC and differential; Future  •  Comprehensive metabolic panel; Future  •  TSH WITH REFLEX TO FREE T4;  Future  •  Vitamin B12; Future  •  Folate; Future  •  RPR-Syphilis Screening (Total Syphilis IGG/IGM); Future  •  Lyme Total AB W Reflex to IGM/IGG; Future  •  MRI brain w wo contrast    Orders:  -     CBC and differential; Future; Expected date: 07/07/2025  -     Comprehensive metabolic panel; Future; Expected date: 07/07/2025  -     TSH WITH REFLEX TO FREE T4; Future; Expected date: 07/07/2025  -     Vitamin B12; Future; Expected date: 07/07/2025  -     Folate; Future; Expected date: 07/07/2025  -     RPR-Syphilis Screening (Total Syphilis IGG/IGM); Future; Expected date: 07/07/2025  -     Lyme Total AB W Reflex to IGM/IGG; Future; Expected date: Collect anytime  -     MRI brain w wo contrast  2. Mixed hyperlipidemia  Assessment & Plan:  Due for labs in the future.  Currently on atorvastatin.       3. Gastroesophageal reflux disease, unspecified whether esophagitis present  Assessment & Plan:  Stable at the moment.  Currently on omeprazole.  Orders:  •  Vitamin B12; Future  •  Folate; Future    Orders:  -     Vitamin B12; Future; Expected date: 07/07/2025  -     Folate; Future; Expected date: 07/07/2025  4. Encounter for screening mammogram for breast cancer  -     Mammo screening bilateral w 3d and cad; Future; Expected date: 07/07/2025  5. Altered mental status, unspecified altered mental status type  -     Vitamin B12; Future; Expected date: 07/07/2025  -     Folate; Future; Expected date: 07/07/2025      Chief Complaint   Patient presents with   • Memory Loss     She is starting to forget things and/or saying the wrong things.            History of Present Illness   Patient is here today because she has several issues she would like to go over.  She has noted some changes in her thought process lately, as well as confusion.  She did mention that her family members noted that there was some changes in her thoughts lately.  She was having trouble processing some different information.  She wondered if there was  "anything else that she should try to deal with.    She comes in today to find out if there is anything else in particular that she should do.      Review of Systems   Constitutional: Negative.    HENT: Negative.     Eyes: Negative.    Respiratory: Negative.     Cardiovascular: Negative.    Gastrointestinal: Negative.    Endocrine: Negative.    Genitourinary: Negative.    Musculoskeletal: Negative.    Skin: Negative.    Allergic/Immunologic: Negative.    Neurological:         Memory issues per HPI   Hematological: Negative.    Psychiatric/Behavioral: Negative.         Objective   /60 (BP Location: Left arm, Patient Position: Sitting, Cuff Size: Standard)   Pulse 74   Ht 5' 1\" (1.549 m)   Wt 56.7 kg (125 lb)   SpO2 98%   BMI 23.62 kg/m²      Physical Exam  Vitals and nursing note reviewed.   Constitutional:       Appearance: Normal appearance. She is well-developed.   HENT:      Head: Normocephalic and atraumatic.     Cardiovascular:      Rate and Rhythm: Normal rate and regular rhythm.      Pulses:           Carotid pulses are 2+ on the right side and 2+ on the left side.     Heart sounds: Normal heart sounds.   Pulmonary:      Effort: Pulmonary effort is normal.      Breath sounds: Normal breath sounds. No wheezing or rales.   Chest:      Chest wall: No tenderness.     Neurological:      Mental Status: She is alert.         [facility] Mental Status Exam (UMS)    Helena Rueda     70 y.o.    Education level:  High School?    yes     Less than High School?   no  For the questions below, yes is correct response, no is incorrect.    1: What day of the week is it?  yes    1  2: What year is it?   yes    1  3: What state are we in?  yes    1    Total points: 3 / 3.    Please remember these 5 objects. I will ask about them later.   Apple Table Davina    Tie House    5: You have $100 and you go to the store and buy a dozen apples for $3, and a tricycle for $20.   How much did you spend?   23    1   How much " do you have left? 77    2    Total points: 3 / 3.    6: Please name as many animals as you can in 1 minute   Number named:  11   0: 0-4 1: 5-9 2: 10-14   3: 15+    Total points: 2 / 3.    7: What were the five objects I asked you to remember?    Number remembered: 2    1 point for each correct object remembered    8: I am going to give you a series of numbers. I want you to give them to me backwards,    Example: If I say 42, you say 24.   0: 87  1: 649  1: [id number] Points: 1    9:  This is a clock face. Please put hour markers and the time at ten minutes to eleven o'clock.       2: Hour markers okay       2: Time correct.    Total points: 0 / 4.    10: Place an X in the triangle.  (Square Triangle Rectangle)    1  Which figure is largest?      1    Total points: 2 / 2.    11: I am going to tell you a story. Please listen carefully because afterwards I'm going to ask you some questions about it.   Rissa was a very succesfull . 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 She then met Millie, a devistatingly handsome man. She  him  and had three children. They live in [place]. When they were teenagers, she             went back to work. She and Prabhjot lived happily ever after.     2: What was the female's name? 2: What work did she do?   2: When did she go back to work?x 2: What state did she live in?x    Total points: 4 / 8.  Eval based on non native     Final Total:  17 / 30        Scoring:   High school education    Less than high school education  27-30    Normal   20-30  20-27    MCI   14-19  1-19    Dementia  1-14

## 2025-07-07 NOTE — PATIENT INSTRUCTIONS
1. Confusion  Assessment & Plan:  Slums test was abnormal today.  17 out of 30, though I do feel that there is some error in testing for her as she is not a  English speaker.  She is originally from [facility].  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  Would recommend laboratory studies, MRI, as there did seem to be a significant amount of change with this lately.  Orders:    CBC and differential; Future    Comprehensive metabolic panel; Future    TSH WITH REFLEX TO FREE T4; Future    Vitamin B12; Future    Folate; Future    RPR-Syphilis Screening (Total Syphilis IGG/IGM); Future    Lyme Total AB W Reflex to IGM/IGG; Future    MRI brain w wo contrast    Orders:  -     CBC and differential; Future; Expected date: 07/07/2025  -     Comprehensive metabolic panel; Future; Expected date: 07/07/2025  -     TSH WITH REFLEX TO FREE T4; Future; Expected date: 07/07/2025  -     Vitamin B12; Future; Expected date: 07/07/2025  -     Folate; Future; Expected date: 07/07/2025  -     RPR-Syphilis Screening (Total Syphilis IGG/IGM); Future; Expected date: 07/07/2025  -     Lyme Total AB W Reflex to IGM/IGG; Future; Expected date: Collect anytime  -     MRI brain w wo contrast  2. Mixed hyperlipidemia  Assessment & Plan:  Due for labs in the future.  Currently on atorvastatin.       3. Gastroesophageal reflux disease, unspecified whether esophagitis present  Assessment & Plan:  Stable at the moment.  Currently on omeprazole.  Orders:    Vitamin B12; Future    Folate; Future    Orders:  -     Vitamin B12; Future; Expected date: 07/07/2025  -     Folate; Future; Expected date: 07/07/2025  4. Encounter for screening mammogram for breast cancer  -     Mammo screening bilateral w 3d and cad; Future; Expected date: 07/07/2025  5. Altered mental status, unspecified altered mental status type  -     Vitamin B12; Future; Expected date: 07/07/2025  -     Folate; Future; Expected date:  07/07/2025      COVID 19 Instructions    Helena Rueda was advised to limit contact with others to essential tasks such as getting food, medications, and medical care.    Proper handwashing reviewed, and Hand sanitzer when washing is not available.    If the patient develops symptoms of COVID 19, the patient should call the office as soon as possible.    It is strongly recommended that Flu Vaccinations be obtained.      Virtual Visits:  You should get a text message when the provider is ready to see you.  Click on the link in the text message, and the call should start.  Make sure you allow camera and microphone access.  This is HIPPA compliant, and secure.  Also, you could access this visit from [employer] in the [facility] siria.      If you have not already done so, get immunized to COVID 19.      We are committed to getting you vaccinated as soon as possible and will be closely following CDC and [facility] guidelines as they are released and revised.  Please refer to our COVID-19 vaccine webpage for the most up to date information on the vaccine and our distribution efforts.    This site will also have the most up to date recommendations for COVID booster vaccine.    [email or web address]    Call [phone] ([phone]), option [phone]    You can also visit https://www.vaccines.gov/ to find vaccines in your area.    OUR LOCATION:    Counts include 234 beds at the [facility]  [address] [place] [address], [address]  [place], [place], [address] [phone]  Fax: [phone]    Lab services, Rheumatology, and OB/GYN are at this location as well.

## 2025-07-07 NOTE — ASSESSMENT & PLAN NOTE
Stable at the moment.  Currently on omeprazole.  Orders:  •  Vitamin B12; Future  •  Folate; Future

## 2025-07-08 ENCOUNTER — APPOINTMENT (OUTPATIENT)
Dept: LAB | Facility: MEDICAL CENTER | Age: 70
End: 2025-07-08
Attending: FAMILY MEDICINE
Payer: MEDICARE

## 2025-07-08 DIAGNOSIS — R41.0 CONFUSION: ICD-10-CM

## 2025-07-08 DIAGNOSIS — K21.9 GASTROESOPHAGEAL REFLUX DISEASE, UNSPECIFIED WHETHER ESOPHAGITIS PRESENT: ICD-10-CM

## 2025-07-08 DIAGNOSIS — R41.82 ALTERED MENTAL STATUS, UNSPECIFIED ALTERED MENTAL STATUS TYPE: ICD-10-CM

## 2025-07-08 LAB
ALBUMIN SERPL BCG-MCNC: 4.4 G/DL (ref 3.5–5)
ALP SERPL-CCNC: 78 U/L (ref 34–104)
ALT SERPL W P-5'-P-CCNC: 33 U/L (ref 7–52)
ANION GAP SERPL CALCULATED.3IONS-SCNC: 6 MMOL/L (ref 4–13)
AST SERPL W P-5'-P-CCNC: 22 U/L (ref 13–39)
BASOPHILS # BLD AUTO: 0.01 THOUSANDS/ÂΜL (ref 0–0.1)
BASOPHILS NFR BLD AUTO: 0 % (ref 0–1)
BILIRUB SERPL-MCNC: 0.45 MG/DL (ref 0.2–1)
BUN SERPL-MCNC: 15 MG/DL (ref 5–25)
CALCIUM SERPL-MCNC: 9.6 MG/DL (ref 8.4–10.2)
CHLORIDE SERPL-SCNC: 108 MMOL/L (ref 96–108)
CO2 SERPL-SCNC: 28 MMOL/L (ref 21–32)
CREAT SERPL-MCNC: 0.85 MG/DL (ref 0.6–1.3)
EOSINOPHIL # BLD AUTO: 0.2 THOUSAND/ÂΜL (ref 0–0.61)
EOSINOPHIL NFR BLD AUTO: 4 % (ref 0–6)
ERYTHROCYTE [DISTWIDTH] IN BLOOD BY AUTOMATED COUNT: 13.6 % (ref 11.6–15.1)
FOLATE SERPL-MCNC: 16.8 NG/ML
GFR SERPL CREATININE-BSD FRML MDRD: 69 ML/MIN/1.73SQ M
GLUCOSE SERPL-MCNC: 110 MG/DL (ref 65–140)
HCT VFR BLD AUTO: 44.7 % (ref 34.8–46.1)
HGB BLD-MCNC: 14.5 G/DL (ref 11.5–15.4)
IMM GRANULOCYTES # BLD AUTO: 0.02 THOUSAND/UL (ref 0–0.2)
IMM GRANULOCYTES NFR BLD AUTO: 0 % (ref 0–2)
LYMPHOCYTES # BLD AUTO: 2.08 THOUSANDS/ÂΜL (ref 0.6–4.47)
LYMPHOCYTES NFR BLD AUTO: 41 % (ref 14–44)
MCH RBC QN AUTO: 28 PG (ref 26.8–34.3)
MCHC RBC AUTO-ENTMCNC: 32.4 G/DL (ref 31.4–37.4)
MCV RBC AUTO: 86 FL (ref 82–98)
MONOCYTES # BLD AUTO: 0.3 THOUSAND/ÂΜL (ref 0.17–1.22)
MONOCYTES NFR BLD AUTO: 6 % (ref 4–12)
NEUTROPHILS # BLD AUTO: 2.47 THOUSANDS/ÂΜL (ref 1.85–7.62)
NEUTS SEG NFR BLD AUTO: 49 % (ref 43–75)
NRBC BLD AUTO-RTO: 0 /100 WBCS
PLATELET # BLD AUTO: 229 THOUSANDS/UL (ref 149–390)
PMV BLD AUTO: 9.4 FL (ref 8.9–12.7)
POTASSIUM SERPL-SCNC: 4.1 MMOL/L (ref 3.5–5.3)
PROT SERPL-MCNC: 6.6 G/DL (ref 6.4–8.4)
RBC # BLD AUTO: 5.18 MILLION/UL (ref 3.81–5.12)
SODIUM SERPL-SCNC: 142 MMOL/L (ref 135–147)
TSH SERPL DL<=0.05 MIU/L-ACNC: 1.68 UIU/ML (ref 0.45–4.5)
VIT B12 SERPL-MCNC: 318 PG/ML (ref 180–914)
WBC # BLD AUTO: 5.08 THOUSAND/UL (ref 4.31–10.16)

## 2025-07-08 PROCEDURE — 86780 TREPONEMA PALLIDUM: CPT

## 2025-07-08 PROCEDURE — 84443 ASSAY THYROID STIM HORMONE: CPT

## 2025-07-08 PROCEDURE — 82607 VITAMIN B-12: CPT

## 2025-07-08 PROCEDURE — 86618 LYME DISEASE ANTIBODY: CPT

## 2025-07-08 PROCEDURE — 36415 COLL VENOUS BLD VENIPUNCTURE: CPT

## 2025-07-08 PROCEDURE — 85025 COMPLETE CBC W/AUTO DIFF WBC: CPT

## 2025-07-08 PROCEDURE — 80053 COMPREHEN METABOLIC PANEL: CPT

## 2025-07-08 PROCEDURE — 82746 ASSAY OF FOLIC ACID SERUM: CPT

## 2025-07-09 LAB
B BURGDOR IGG+IGM SER QL IA: NEGATIVE
TREPONEMA PALLIDUM IGG+IGM AB [PRESENCE] IN SERUM OR PLASMA BY IMMUNOASSAY: NORMAL

## 2025-07-30 ENCOUNTER — TELEPHONE (OUTPATIENT)
Age: 70
End: 2025-07-30

## 2025-08-15 ENCOUNTER — RESULTS FOLLOW-UP (OUTPATIENT)
Dept: FAMILY MEDICINE CLINIC | Facility: CLINIC | Age: 70
End: 2025-08-15